# Patient Record
Sex: FEMALE | Race: WHITE | NOT HISPANIC OR LATINO | Employment: FULL TIME | URBAN - METROPOLITAN AREA
[De-identification: names, ages, dates, MRNs, and addresses within clinical notes are randomized per-mention and may not be internally consistent; named-entity substitution may affect disease eponyms.]

---

## 2017-01-12 ENCOUNTER — HOSPITAL ENCOUNTER (EMERGENCY)
Facility: HOSPITAL | Age: 52
Discharge: HOME | End: 2017-01-12
Attending: EMERGENCY MEDICINE | Admitting: EMERGENCY MEDICINE

## 2017-01-12 VITALS
BODY MASS INDEX: 23.05 KG/M2 | HEART RATE: 79 BPM | WEIGHT: 135 LBS | RESPIRATION RATE: 19 BRPM | TEMPERATURE: 98.2 F | HEIGHT: 64 IN | OXYGEN SATURATION: 99 % | DIASTOLIC BLOOD PRESSURE: 73 MMHG | SYSTOLIC BLOOD PRESSURE: 131 MMHG

## 2017-01-12 DIAGNOSIS — G51.0 BELL'S PALSY: Primary | ICD-10-CM

## 2017-01-12 PROCEDURE — 99283 EMERGENCY DEPT VISIT LOW MDM: CPT

## 2017-01-12 RX ORDER — FAMCICLOVIR 500 MG/1
500 TABLET ORAL 3 TIMES DAILY
Qty: 21 TABLET | Refills: 0 | Status: SHIPPED | OUTPATIENT
Start: 2017-01-12 | End: 2020-09-10 | Stop reason: SDUPTHER

## 2017-01-12 RX ORDER — FENOFIBRATE 145 MG/1
145 TABLET, COATED ORAL DAILY
COMMUNITY
End: 2020-10-13 | Stop reason: SDUPTHER

## 2017-01-12 RX ORDER — METHYLPREDNISOLONE 4 MG/1
TABLET ORAL
Qty: 1 EACH | Refills: 0 | Status: SHIPPED | OUTPATIENT
Start: 2017-01-12 | End: 2020-09-10 | Stop reason: SDUPTHER

## 2017-01-12 NOTE — ED NOTES
Pt reports right facial numbness, facial drop, numbness to right arm x2 days     Lian Grace RN  01/12/17 4302

## 2017-08-16 ENCOUNTER — APPOINTMENT (OUTPATIENT)
Dept: WOMENS IMAGING | Facility: HOSPITAL | Age: 52
End: 2017-08-16

## 2017-08-16 PROCEDURE — 77067 SCR MAMMO BI INCL CAD: CPT | Performed by: RADIOLOGY

## 2017-08-16 PROCEDURE — 77063 BREAST TOMOSYNTHESIS BI: CPT | Performed by: RADIOLOGY

## 2018-08-20 ENCOUNTER — APPOINTMENT (OUTPATIENT)
Dept: WOMENS IMAGING | Facility: HOSPITAL | Age: 53
End: 2018-08-20

## 2018-08-20 PROCEDURE — 77063 BREAST TOMOSYNTHESIS BI: CPT | Performed by: RADIOLOGY

## 2018-08-20 PROCEDURE — 77067 SCR MAMMO BI INCL CAD: CPT | Performed by: RADIOLOGY

## 2019-08-22 ENCOUNTER — APPOINTMENT (OUTPATIENT)
Dept: WOMENS IMAGING | Facility: HOSPITAL | Age: 54
End: 2019-08-22

## 2019-08-22 PROCEDURE — 77067 SCR MAMMO BI INCL CAD: CPT | Performed by: RADIOLOGY

## 2019-08-22 PROCEDURE — 77063 BREAST TOMOSYNTHESIS BI: CPT | Performed by: RADIOLOGY

## 2020-08-24 ENCOUNTER — APPOINTMENT (OUTPATIENT)
Dept: WOMENS IMAGING | Facility: HOSPITAL | Age: 55
End: 2020-08-24

## 2020-08-24 PROCEDURE — 77063 BREAST TOMOSYNTHESIS BI: CPT | Performed by: RADIOLOGY

## 2020-08-24 PROCEDURE — 77067 SCR MAMMO BI INCL CAD: CPT | Performed by: RADIOLOGY

## 2020-09-03 DIAGNOSIS — E78.2 HYPERLIPIDEMIA TYPE III: Primary | ICD-10-CM

## 2020-09-03 DIAGNOSIS — Z80.9 FAMILY HISTORY OF CANCER: ICD-10-CM

## 2020-09-03 DIAGNOSIS — E55.9 VITAMIN D DEFICIENCY: ICD-10-CM

## 2020-09-03 DIAGNOSIS — E78.2 HYPERLIPIDEMIA TYPE III: ICD-10-CM

## 2020-09-04 PROBLEM — E78.2 HYPERLIPIDEMIA TYPE III: Status: ACTIVE | Noted: 2020-09-04

## 2020-09-04 PROBLEM — Z83.71 FAMILY HISTORY OF COLONIC POLYPS: Status: ACTIVE | Noted: 2020-09-04

## 2020-09-04 PROBLEM — Z83.719 FAMILY HISTORY OF COLONIC POLYPS: Status: ACTIVE | Noted: 2020-09-04

## 2020-09-04 PROBLEM — K59.00 CONSTIPATION: Status: ACTIVE | Noted: 2020-09-04

## 2020-09-04 PROBLEM — E55.9 VITAMIN D DEFICIENCY: Status: ACTIVE | Noted: 2020-09-04

## 2020-09-04 PROBLEM — I73.00 RAYNAUD'S DISEASE WITHOUT GANGRENE: Status: ACTIVE | Noted: 2020-09-04

## 2020-09-04 PROBLEM — Z00.00 HEALTH MAINTENANCE EXAMINATION: Status: ACTIVE | Noted: 2020-09-04

## 2020-09-04 PROBLEM — Z80.0 FAMILY HISTORY OF COLON CANCER: Status: ACTIVE | Noted: 2020-09-04

## 2020-09-04 LAB
25(OH)D3+25(OH)D2 SERPL-MCNC: 48.4 NG/ML (ref 30–100)
ALBUMIN SERPL-MCNC: 5.1 G/DL (ref 3.8–4.9)
ALBUMIN/GLOB SERPL: 2.4 {RATIO} (ref 1.2–2.2)
ALP SERPL-CCNC: 74 IU/L (ref 39–117)
ALT SERPL-CCNC: 16 IU/L (ref 0–32)
AST SERPL-CCNC: 20 IU/L (ref 0–40)
BASOPHILS # BLD AUTO: 0 X10E3/UL (ref 0–0.2)
BASOPHILS NFR BLD AUTO: 1 %
BILIRUB SERPL-MCNC: 0.4 MG/DL (ref 0–1.2)
BUN SERPL-MCNC: 18 MG/DL (ref 6–24)
BUN/CREAT SERPL: 18 (ref 9–23)
CALCIUM SERPL-MCNC: 9.9 MG/DL (ref 8.7–10.2)
CHLORIDE SERPL-SCNC: 100 MMOL/L (ref 96–106)
CHOLEST SERPL-MCNC: 211 MG/DL (ref 100–199)
CO2 SERPL-SCNC: 26 MMOL/L (ref 20–29)
CREAT SERPL-MCNC: 0.99 MG/DL (ref 0.57–1)
EOSINOPHIL # BLD AUTO: 0.2 X10E3/UL (ref 0–0.4)
EOSINOPHIL NFR BLD AUTO: 3 %
ERYTHROCYTE [DISTWIDTH] IN BLOOD BY AUTOMATED COUNT: 12.9 % (ref 11.7–15.4)
GLOBULIN SER CALC-MCNC: 2.1 G/DL (ref 1.5–4.5)
GLUCOSE SERPL-MCNC: 85 MG/DL (ref 65–99)
HCT VFR BLD AUTO: 41.8 % (ref 34–46.6)
HDLC SERPL-MCNC: 50 MG/DL
HGB BLD-MCNC: 13.7 G/DL (ref 11.1–15.9)
IMM GRANULOCYTES # BLD AUTO: 0 X10E3/UL (ref 0–0.1)
IMM GRANULOCYTES NFR BLD AUTO: 0 %
LDLC SERPL CALC-MCNC: 134 MG/DL (ref 0–99)
LDLC/HDLC SERPL: 2.7 RATIO (ref 0–3.2)
LYMPHOCYTES # BLD AUTO: 1.6 X10E3/UL (ref 0.7–3.1)
LYMPHOCYTES NFR BLD AUTO: 30 %
MCH RBC QN AUTO: 29.5 PG (ref 26.6–33)
MCHC RBC AUTO-ENTMCNC: 32.8 G/DL (ref 31.5–35.7)
MCV RBC AUTO: 90 FL (ref 79–97)
MONOCYTES # BLD AUTO: 0.4 X10E3/UL (ref 0.1–0.9)
MONOCYTES NFR BLD AUTO: 8 %
NEUTROPHILS # BLD AUTO: 3.1 X10E3/UL (ref 1.4–7)
NEUTROPHILS NFR BLD AUTO: 58 %
PLATELET # BLD AUTO: 207 X10E3/UL (ref 150–450)
POTASSIUM SERPL-SCNC: 4.6 MMOL/L (ref 3.5–5.2)
PROT SERPL-MCNC: 7.2 G/DL (ref 6–8.5)
RBC # BLD AUTO: 4.65 X10E6/UL (ref 3.77–5.28)
SODIUM SERPL-SCNC: 142 MMOL/L (ref 134–144)
TRIGL SERPL-MCNC: 153 MG/DL (ref 0–149)
VLDLC SERPL CALC-MCNC: 27 MG/DL (ref 5–40)
WBC # BLD AUTO: 5.4 X10E3/UL (ref 3.4–10.8)

## 2020-09-04 NOTE — PROGRESS NOTES
Subjective   Candace Dennis is a 54 y.o. female.     Chief Complaint   Patient presents with   • Annual Exam       History of Present Illness   This is a 54-year-old female with past medical history significant for hyperlipidemia type III, vitamin D deficiency, raynauds disease, chronic constipation, family history of colon cancer, status post tonsillectomy and adenoidectomy at age 17, status post bilateral tubal ligation as well as an ablation in 2009 who presents for a routine health maintenance exam.  The patient has been working from home and practicing social distancing.  She has recently been caring for HER-2-year-old grandchild and her daughter is due to have a little girl in 2 months.  She is currently walking for exercise.  She is currently due for colonoscopy but is not wanting to schedule this today.  She does have a history of Raynauds, this is been relatively well controlled recently but does tend to exacerbate especially in the winter months and occasionally even during the summer, she has a prescription at home and will contact us with the name of this prescription.  The patient is getting calcium in her diet on a regular basis and is taking a vitamin D supplement.  DEXA scan was last completed with her gynecologist, mammogram is also up-to-date with her gynecologist.    The following portions of the patient's history were reviewed and updated as appropriate: allergies, current medications, past family history, past medical history, past social history, past surgical history and problem list.    Depression Screen:  PHQ-2/PHQ-9 Depression Screening 9/10/2020   Little interest or pleasure in doing things 0   Feeling down, depressed, or hopeless 1   Total Score 1       Assessment/Plan   Candace was seen today for annual exam.    Diagnoses and all orders for this visit:    Health maintenance examination    Hyperlipidemia type III    Family history of colon cancer    Family history of colonic polyps    Raynaud's  disease without gangrene    Vitamin D deficiency    #1.  Health maintenance exam-exam completed, labs reviewed with the patient.  2.  Hyperlipidemia type III-LDL goal is less than 130, total cholesterol 211, HDL 50, triglycerides 153 and , patient is currently on fenofibrate 145 mg once daily, would recommend that we continue current treatment.  3.  Family history of colon cancer/family history of colon polyps-last colonoscopy completed approximately May 2015, patient is due for a repeat colonoscopy but is not wanting to schedule this at this time, we did discuss the need to make sure that this is scheduled in a timely fashion due to increased risk.  Patient will call back to the office to schedule this.  4.  Raynauds-currently stable, patient does have more frequent symptoms occurring in the winter and also has symptoms during the summer months as well, patient is on medication which she believes is amlodipine and will call us back with the specific name and dose of this medication.  5.  Vitamin D deficiency-level is currently 48.4, would recommend continuing current treatment with vitamin D3.     EKG in office-sinus rhythm with a ventricular rate of 78, no ST or T wave changes of concern, normal axis deviation.  We did discuss vaccinations, specifically influenza vaccination which she has previously deferred, Shingrix vaccination was discussed in detail, we also discussed the importance of obtaining a Tdap vaccination before her grandchild is born, she will contact the office to come back in to have this vaccination.    Follow-up in 6 months with lab-CMP, lipid, vitamin D         Past Medical History:   Diagnosis Date   • Anal fissure    • Constipation    • Family history of colon cancer    • Family history of polyps in the colon    • Hyperlipidemia type III    • Insomnia    • Non-smoker    • Raynaud phenomenon    • Rectal bleeding    • Rectal pain        Past Surgical History:   Procedure Laterality Date  "  • COLONOSCOPY      2006-Dr. Graves-Normal per pt./5/19/2015-Internal hemorrhoids   • ENDOMETRIAL ABLATION  2007   • TONSILLECTOMY AND ADENOIDECTOMY      at age 17   • TUBAL ABDOMINAL LIGATION  2007       Family History   Problem Relation Age of Onset   • Colon cancer Other         aunt, uncle, cousin   • Colon polyps Other         Aunt, Uncle, cousin       Social History     Socioeconomic History   • Marital status:      Spouse name: Not on file   • Number of children: Not on file   • Years of education: Not on file   • Highest education level: Not on file   Tobacco Use   • Smoking status: Never Smoker   • Smokeless tobacco: Never Used   Substance and Sexual Activity   • Alcohol use: Not Currently     Comment: social   • Drug use: Defer       Current Outpatient Medications   Medication Sig Dispense Refill   • fenofibrate (TRICOR) 145 MG tablet Take 145 mg by mouth Daily.     • Progesterone Micronized (PROGESTERONE PO) Take  by mouth.     • ESTROGEL 0.75 MG/1.25 GM (0.06%) topical gel CHRISTINE 2 PUMPS ONTO THE SKIN D     • traZODone (DESYREL) 50 MG tablet TK 1/2 T PO HS       No current facility-administered medications for this visit.        Review of Systems    Objective   /60   Pulse 80   Ht 162.6 cm (64\")   Wt 62.6 kg (138 lb)   SpO2 99%   BMI 23.69 kg/m²     Physical Exam  Constitutional:  The patient appears well developed and well nourished and is in no acute distress.  Head & Face:  Head and face are symmetric, normocephalic and atraumatic.  Palpation of the face and sinuses show them to be non-tender and without masses.  Eyes:  Inspection of the conjunctiva and lids reveal no swelling, erythema or discharge.  The pupils are equal, round and reactive to light.  Fundoscopic exam of the eyes reveals normal fundi and optic discs.  Ears, Nose, Mouth & Throat:  On inspecton, the ears and nose are within normal limits.  Otoscopic exam reveals tympanic membranes are translucent with normal light " reflex.  Canals are patent without erythema.  Lips, teeth, and gums appear healthy with good dentition.  The oropharynx is normal with no erythema, edema, exudate or lesions.  Neck:  The neck was normal in appearance and supple.  The trachea was midline.  Exam of the thyroid reveals no thromegaly or masses.  Pulmonary:  Respiratory effort is normal without evidence of respiratory distress.  Lungs are clear to auscultation bilaterally.  Cardiovascular:  The apical impulse was normal,  The heart rate was normal,  The rhythm was regular.  Hearts sounds reveal a normal S1, a normal S2, no gallops, rubs or murmurs.    Extremities:  Pedal pulses - Dorsalis Pedis/Posterior Tibialis are 2+/4 bilaterally.  Examination of the extremities show no edema.   Abdomen:  The abdomen is soft, nontender and without masses.  Bowel sounds are positive in all four quadrants.  There is no palpable hepatomegaly or splenomegaly.  Lymphatic:  There is no palpable lymphadenopathy appreciated of neck, axilla or inguinal regions.  Musculoskeletal: Gait and station are within normal limits.  Skin:  Skin and subcutaneous tissues are normal and without rashes or lesions.   Neurologic:  Cranial nerves II-XII are intact.  Reflexes are 2+ and symmetric.  Patient demonstrates no sensory loss.  Psychiatric:  Patient's judgement and insight are unimpaired.  Patient is oriented to person, time and place.  Patient's mood and affect are normal.      Recent Results (from the past 2016 hour(s))   CBC & Differential    Collection Time: 09/03/20  9:36 AM   Result Value Ref Range    WBC 5.4 3.4 - 10.8 x10E3/uL    RBC 4.65 3.77 - 5.28 x10E6/uL    Hemoglobin 13.7 11.1 - 15.9 g/dL    Hematocrit 41.8 34.0 - 46.6 %    MCV 90 79 - 97 fL    MCH 29.5 26.6 - 33.0 pg    MCHC 32.8 31.5 - 35.7 g/dL    RDW 12.9 11.7 - 15.4 %    Platelets 207 150 - 450 x10E3/uL    Neutrophil Rel % 58 Not Estab. %    Lymphocyte Rel % 30 Not Estab. %    Monocyte Rel % 8 Not Estab. %     Eosinophil Rel % 3 Not Estab. %    Basophil Rel % 1 Not Estab. %    Neutrophils Absolute 3.1 1.4 - 7.0 x10E3/uL    Lymphocytes Absolute 1.6 0.7 - 3.1 x10E3/uL    Monocytes Absolute 0.4 0.1 - 0.9 x10E3/uL    Eosinophils Absolute 0.2 0.0 - 0.4 x10E3/uL    Basophils Absolute 0.0 0.0 - 0.2 x10E3/uL    Immature Granulocyte Rel % 0 Not Estab. %    Immature Grans Absolute 0.0 0.0 - 0.1 x10E3/uL   Vitamin D 25 hydroxy    Collection Time: 09/03/20  9:36 AM   Result Value Ref Range    25 Hydroxy, Vitamin D 48.4 30.0 - 100.0 ng/mL   Lipid Panel With LDL / HDL Ratio    Collection Time: 09/03/20  9:36 AM   Result Value Ref Range    Total Cholesterol 211 (H) 100 - 199 mg/dL    Triglycerides 153 (H) 0 - 149 mg/dL    HDL Cholesterol 50 >39 mg/dL    VLDL Cholesterol Remy 27 5 - 40 mg/dL    LDL Chol Calc (NIH) 134 (H) 0 - 99 mg/dL    LDL/HDL RATIO 2.7 0.0 - 3.2 ratio   Comprehensive Metabolic Panel    Collection Time: 09/03/20  9:36 AM   Result Value Ref Range    Glucose 85 65 - 99 mg/dL    BUN 18 6 - 24 mg/dL    Creatinine 0.99 0.57 - 1.00 mg/dL    eGFR Non African Am 64 >59 mL/min/1.73    eGFR African Am 74 >59 mL/min/1.73    BUN/Creatinine Ratio 18 9 - 23    Sodium 142 134 - 144 mmol/L    Potassium 4.6 3.5 - 5.2 mmol/L    Chloride 100 96 - 106 mmol/L    Total CO2 26 20 - 29 mmol/L    Calcium 9.9 8.7 - 10.2 mg/dL    Total Protein 7.2 6.0 - 8.5 g/dL    Albumin 5.1 (H) 3.8 - 4.9 g/dL    Globulin 2.1 1.5 - 4.5 g/dL    A/G Ratio 2.4 (H) 1.2 - 2.2    Total Bilirubin 0.4 0.0 - 1.2 mg/dL    Alkaline Phosphatase 74 39 - 117 IU/L    AST (SGOT) 20 0 - 40 IU/L    ALT (SGPT) 16 0 - 32 IU/L

## 2020-09-10 ENCOUNTER — OFFICE VISIT (OUTPATIENT)
Dept: INTERNAL MEDICINE | Facility: CLINIC | Age: 55
End: 2020-09-10

## 2020-09-10 VITALS
DIASTOLIC BLOOD PRESSURE: 60 MMHG | HEART RATE: 80 BPM | WEIGHT: 138 LBS | BODY MASS INDEX: 23.56 KG/M2 | HEIGHT: 64 IN | SYSTOLIC BLOOD PRESSURE: 110 MMHG | OXYGEN SATURATION: 99 %

## 2020-09-10 DIAGNOSIS — E78.2 HYPERLIPIDEMIA TYPE III: ICD-10-CM

## 2020-09-10 DIAGNOSIS — Z80.0 FAMILY HISTORY OF COLON CANCER: ICD-10-CM

## 2020-09-10 DIAGNOSIS — Z83.71 FAMILY HISTORY OF COLONIC POLYPS: ICD-10-CM

## 2020-09-10 DIAGNOSIS — I73.00 RAYNAUD'S DISEASE WITHOUT GANGRENE: ICD-10-CM

## 2020-09-10 DIAGNOSIS — Z00.00 HEALTH MAINTENANCE EXAMINATION: Primary | ICD-10-CM

## 2020-09-10 DIAGNOSIS — E55.9 VITAMIN D DEFICIENCY: ICD-10-CM

## 2020-09-10 PROCEDURE — 99396 PREV VISIT EST AGE 40-64: CPT | Performed by: INTERNAL MEDICINE

## 2020-09-10 RX ORDER — ESTRADIOL 1.25 MG/1.25G
GEL TOPICAL
COMMUNITY
End: 2020-09-10 | Stop reason: SDUPTHER

## 2020-09-10 RX ORDER — ESTRADIOL 0.75 MG/1.25G
GEL, METERED TOPICAL
COMMUNITY
Start: 2020-08-05 | End: 2020-12-11 | Stop reason: SDUPTHER

## 2020-09-10 RX ORDER — TRAZODONE HYDROCHLORIDE 50 MG/1
TABLET ORAL
COMMUNITY
Start: 2020-08-05 | End: 2021-04-30

## 2020-09-11 RX ORDER — AMLODIPINE BESYLATE 2.5 MG/1
2.5 TABLET ORAL DAILY
COMMUNITY
End: 2021-04-30

## 2020-10-06 PROCEDURE — 90715 TDAP VACCINE 7 YRS/> IM: CPT | Performed by: INTERNAL MEDICINE

## 2020-10-06 PROCEDURE — 90471 IMMUNIZATION ADMIN: CPT | Performed by: INTERNAL MEDICINE

## 2020-10-14 RX ORDER — FENOFIBRATE 145 MG/1
145 TABLET, COATED ORAL DAILY
Qty: 90 TABLET | Refills: 1 | Status: SHIPPED | OUTPATIENT
Start: 2020-10-14 | End: 2021-08-09 | Stop reason: SDUPTHER

## 2020-12-11 ENCOUNTER — OFFICE VISIT (OUTPATIENT)
Dept: INTERNAL MEDICINE | Facility: CLINIC | Age: 55
End: 2020-12-11

## 2020-12-11 VITALS
BODY MASS INDEX: 22.71 KG/M2 | DIASTOLIC BLOOD PRESSURE: 80 MMHG | SYSTOLIC BLOOD PRESSURE: 122 MMHG | TEMPERATURE: 98 F | WEIGHT: 133 LBS | HEIGHT: 64 IN

## 2020-12-11 DIAGNOSIS — R51.9 PERSISTENT HEADACHES: Primary | ICD-10-CM

## 2020-12-11 PROCEDURE — 99213 OFFICE O/P EST LOW 20 MIN: CPT | Performed by: INTERNAL MEDICINE

## 2020-12-11 RX ORDER — MULTIVIT WITH MINERALS/LUTEIN
250 TABLET ORAL DAILY
COMMUNITY

## 2020-12-11 RX ORDER — GABAPENTIN 300 MG/1
CAPSULE ORAL
Qty: 60 CAPSULE | Refills: 0 | Status: SHIPPED | OUTPATIENT
Start: 2020-12-11 | End: 2021-04-30

## 2020-12-11 NOTE — PROGRESS NOTES
Subjective   Candace Dennis is a 55 y.o. female.     Chief Complaint   Patient presents with   • Headache       History of Present Illness   This is a 54-year-old female with past medical history significant for hyperlipidemia type III, vitamin D deficiency, raynauds disease, chronic constipation, family history of colon cancer, status post tonsillectomy and adenoidectomy at age 17, status post bilateral tubal ligation as well as an ablation in 2009 who presents for an acute visit..  The patient has been working from home and practicing social distancing.  The patient presents with a 4-week history of headaches which occur predominantly when she lies down.  The pain radiates down the back of her neck.  She has noted some mild tingling in the left anterior aspect of her chin.  The patient states that she noted that the symptoms were similar to a previous episode that she had 2 years ago when she was subsequently diagnosed with Bell's palsy.  The patient states that the head and neck pain have been fairly persistent.  She has not had any visual changes, no blurred vision, no light sensitivity.  She has taken Tylenol without any significant benefit.  She previously had been on amitriptyline and had some of these tablets left over which did provide some mild relief.  No other voiced complaints.    The following portions of the patient's history were reviewed and updated as appropriate: allergies, current medications, past family history, past medical history, past social history, past surgical history and problem list.    Depression Screen:  PHQ-2/PHQ-9 Depression Screening 9/10/2020   Little interest or pleasure in doing things 0   Feeling down, depressed, or hopeless 1   Total Score 1       Assessment/Plan   Diagnoses and all orders for this visit:    1. Persistent headaches (Primary)  -     gabapentin (NEURONTIN) 300 MG capsule; 1 tablet p.o. twice daily  Dispense: 60 capsule; Refill: 0         #1.  Cervicogenic  headaches-unclear etiology, examination was normal other than some mild tenderness in the left posterior neck with certain range of motion, recommend we treat patient with gabapentin to see if this helps to alleviate her headaches, would recommend avoiding nonsteroidal anti-inflammatory agents given previous renal functions.        Past Medical History:   Diagnosis Date   • Anal fissure    • Constipation    • Family history of colon cancer    • Family history of polyps in the colon    • Hyperlipidemia type III    • Insomnia    • Non-smoker    • Raynaud phenomenon    • Rectal bleeding    • Rectal pain        Past Surgical History:   Procedure Laterality Date   • COLONOSCOPY      2006-Dr. Graves-Normal per pt./5/19/2015-Internal hemorrhoids   • ENDOMETRIAL ABLATION  2007   • TONSILLECTOMY AND ADENOIDECTOMY      at age 17   • TUBAL ABDOMINAL LIGATION  2007       Family History   Problem Relation Age of Onset   • Colon cancer Other         aunt, uncle, cousin   • Colon polyps Other         Aunt, Uncle, cousin       Social History     Socioeconomic History   • Marital status:      Spouse name: Not on file   • Number of children: Not on file   • Years of education: Not on file   • Highest education level: Not on file   Tobacco Use   • Smoking status: Never Smoker   • Smokeless tobacco: Never Used   Substance and Sexual Activity   • Alcohol use: Not Currently     Comment: social   • Drug use: Defer       Current Outpatient Medications   Medication Sig Dispense Refill   • Cholecalciferol (vitamin D3) 125 MCG (5000 UT) capsule capsule Take 5,000 Units by mouth Daily.     • fenofibrate (TRICOR) 145 MG tablet Take 1 tablet by mouth Daily. 90 tablet 1   • traZODone (DESYREL) 50 MG tablet TK 1/2 T PO HS     • vitamin C (ASCORBIC ACID) 250 MG tablet Take 250 mg by mouth Daily.     • amLODIPine (NORVASC) 2.5 MG tablet Take 2.5 mg by mouth Daily.     • gabapentin (NEURONTIN) 300 MG capsule 1 tablet p.o. twice daily 60  "capsule 0     No current facility-administered medications for this visit.        Review of Systems   Musculoskeletal: Positive for neck pain and neck stiffness.   Neurological: Positive for numbness and headache.   All other systems reviewed and are negative.      Objective   /80   Temp 98 °F (36.7 °C)   Ht 162.6 cm (64\")   Wt 60.3 kg (133 lb)   BMI 22.83 kg/m²     Physical Exam  Constitutional:  The patient appears well developed and well nourished and is in no acute distress.  Head & Face:  Head and face are symmetric, normocephalic and atraumatic.  Palpation of the face and sinuses show them to be non-tender and without masses.  Normal sensation over the chin with monofilament examination  Eyes:  Inspection of the conjunctiva and lids reveal no swelling, erythema or discharge.  The pupils are equal, round and reactive to light.  Fundoscopic exam of the eyes reveals normal fundi and optic discs.  Ears, Nose, Mouth & Throat:  On inspecton, the ears and nose are within normal limits.  Otoscopic exam reveals tympanic membranes are translucent with normal light reflex.  Canals are patent without erythema.  Lips, teeth, and gums appear healthy with good dentition.  The oropharynx is normal with no erythema, edema, exudate or lesions.  Neck:  The neck was normal in appearance and supple.  The trachea was midline.  Exam of the thyroid reveals no thromegaly or masses.  Pulmonary:  Respiratory effort is normal without evidence of respiratory distress.  Lungs are clear to auscultation bilaterally.  Cardiovascular:  The apical impulse was normal,  The heart rate was normal,  The rhythm was regular.  Hearts sounds reveal a normal S1, a normal S2, no gallops, rubs or murmurs.    Extremities:  Pedal pulses - Dorsalis Pedis/Posterior Tibialis are 2+/4 bilaterally.  Examination of the extremities show no edema.   Musculoskeletal: Gait and station are within normal limits.  Mild tenderness over the left posterior neck, " mild tenderness to palpation, mild tenderness with lateral rotation and extension  Skin:  Skin and subcutaneous tissues are normal and without rashes or lesions.   Neurologic:  Cranial nerves II-XII are intact.  Reflexes are 2+ and symmetric.  Patient demonstrates no sensory loss.  Psychiatric:  Patient's judgement and insight are unimpaired.  Patient is oriented to person, time and place.  Patient's mood and affect are normal.  No results found for this or any previous visit (from the past 2016 hour(s)).

## 2021-03-30 ENCOUNTER — BULK ORDERING (OUTPATIENT)
Dept: CASE MANAGEMENT | Facility: OTHER | Age: 56
End: 2021-03-30

## 2021-03-30 DIAGNOSIS — Z23 IMMUNIZATION DUE: ICD-10-CM

## 2021-04-30 ENCOUNTER — OFFICE VISIT (OUTPATIENT)
Dept: INTERNAL MEDICINE | Facility: CLINIC | Age: 56
End: 2021-04-30

## 2021-04-30 VITALS
BODY MASS INDEX: 22.88 KG/M2 | TEMPERATURE: 96.1 F | WEIGHT: 134 LBS | HEART RATE: 69 BPM | DIASTOLIC BLOOD PRESSURE: 80 MMHG | SYSTOLIC BLOOD PRESSURE: 116 MMHG | RESPIRATION RATE: 16 BRPM | OXYGEN SATURATION: 98 % | HEIGHT: 64 IN

## 2021-04-30 DIAGNOSIS — Z00.00 ROUTINE GENERAL MEDICAL EXAMINATION AT A HEALTH CARE FACILITY: Primary | ICD-10-CM

## 2021-04-30 PROBLEM — E78.2 MODERATE MIXED HYPERLIPIDEMIA NOT REQUIRING STATIN THERAPY: Chronic | Status: ACTIVE | Noted: 2020-09-04

## 2021-04-30 PROBLEM — E78.1 HYPERTRIGLYCERIDEMIA: Status: ACTIVE | Noted: 2019-07-02

## 2021-04-30 PROBLEM — Z83.719 FAMILY HISTORY OF COLONIC POLYPS: Status: RESOLVED | Noted: 2020-09-04 | Resolved: 2021-04-30

## 2021-04-30 PROBLEM — G47.00 MIXED INSOMNIA: Status: ACTIVE | Noted: 2019-11-06

## 2021-04-30 PROBLEM — R51.9 PERSISTENT HEADACHES: Status: RESOLVED | Noted: 2020-12-11 | Resolved: 2021-04-30

## 2021-04-30 PROBLEM — Z83.71 FAMILY HISTORY OF COLONIC POLYPS: Status: RESOLVED | Noted: 2020-09-04 | Resolved: 2021-04-30

## 2021-04-30 LAB
BILIRUB BLD-MCNC: NEGATIVE MG/DL
CLARITY, POC: ABNORMAL
COLOR UR: YELLOW
GLUCOSE UR STRIP-MCNC: NEGATIVE MG/DL
KETONES UR QL: NEGATIVE
LEUKOCYTE EST, POC: NEGATIVE
NITRITE UR-MCNC: NEGATIVE MG/ML
PH UR: 7 [PH] (ref 5–8)
PROT UR STRIP-MCNC: NEGATIVE MG/DL
RBC # UR STRIP: NEGATIVE /UL
SP GR UR: 1.01 (ref 1–1.03)
UROBILINOGEN UR QL: NORMAL

## 2021-04-30 PROCEDURE — 99396 PREV VISIT EST AGE 40-64: CPT | Performed by: INTERNAL MEDICINE

## 2021-04-30 PROCEDURE — 81003 URINALYSIS AUTO W/O SCOPE: CPT | Performed by: INTERNAL MEDICINE

## 2021-04-30 RX ORDER — MELOXICAM 15 MG/1
15 TABLET ORAL DAILY
COMMUNITY
Start: 2020-12-28 | End: 2021-04-30

## 2021-04-30 RX ORDER — CYCLOBENZAPRINE HCL 10 MG
10 TABLET ORAL
COMMUNITY
Start: 2020-12-28 | End: 2021-04-30

## 2021-04-30 NOTE — PROGRESS NOTES
Chief Complaint   Patient presents with   • Establish Care   • Annual Exam     PT HERE TO ESTABLISH CARE. PAST MEDICAL, SURGICAL, SOCIAL, FAMILY HISTORY REVIEWED WITH PATIENT AND PLACED IN CHART.     Subjective   History of Present Illness    Candace Dennis is a 55 y.o. female here for annual wellness. Pt does not have acute issues to discuss today. States overall things are going well. Taking all meds as prescribed without any issues.     The following portions of the patient's history were reviewed and updated as appropriate: allergies, current medications, past family history, past medical history, past social history, past surgical history, and problem list.    Patient Care Team:  Shruti Link MD as PCP - General (Internal Medicine & Pediatrics)  Sarina Leigh MD as Consulting Physician (Obstetrics and Gynecology)    Review of Systems   Constitutional: Negative for activity change, chills and fever.   HENT: Negative for congestion, ear pain, rhinorrhea and sore throat.    Eyes: Negative for double vision, pain and visual disturbance.   Respiratory: Positive for cough. Negative for shortness of breath and wheezing.    Cardiovascular: Negative for chest pain, palpitations and leg swelling.   Gastrointestinal: Negative for abdominal pain, blood in stool, constipation, diarrhea, nausea and vomiting.   Endocrine: Negative for cold intolerance and heat intolerance.   Genitourinary: Negative for difficulty urinating, dysuria and frequency.   Musculoskeletal: Negative for arthralgias and myalgias.   Skin: Negative for rash and skin lesions.   Neurological: Negative for dizziness, tremors and headache.   Hematological: Negative for adenopathy. Does not bruise/bleed easily.   Psychiatric/Behavioral: Negative for behavioral problems and suicidal ideas.       Body mass index is 23 kg/m².   Vitals:    04/30/21 0852   BP: 116/80   Pulse: 69   Resp: 16   Temp: 96.1 °F (35.6 °C)   SpO2: 98%   Weight: 60.8 kg (134 lb)  "  Height: 162.6 cm (64\")        Objective   Physical Exam  Vitals and nursing note reviewed.   Constitutional:       General: She is not in acute distress.     Appearance: Normal appearance.   HENT:      Head: Normocephalic and atraumatic.      Right Ear: Tympanic membrane and ear canal normal.      Left Ear: Tympanic membrane and ear canal normal.      Nose: Nose normal.      Mouth/Throat:      Mouth: Mucous membranes are moist.      Pharynx: Oropharynx is clear.   Eyes:      Extraocular Movements: Extraocular movements intact.      Conjunctiva/sclera: Conjunctivae normal.      Pupils: Pupils are equal, round, and reactive to light.   Cardiovascular:      Rate and Rhythm: Normal rate and regular rhythm.      Heart sounds: Normal heart sounds. No murmur heard.     Pulmonary:      Effort: Pulmonary effort is normal. No respiratory distress.      Breath sounds: Normal breath sounds. No wheezing or rhonchi.   Abdominal:      General: Bowel sounds are normal. There is no distension.      Palpations: Abdomen is soft. There is no mass.      Tenderness: There is no abdominal tenderness.      Comments: no hepatosplenomegaly   Musculoskeletal:         General: No deformity. Normal range of motion.      Cervical back: Normal range of motion and neck supple.   Skin:     General: Skin is warm and dry.      Capillary Refill: Capillary refill takes less than 2 seconds.      Findings: No lesion or rash.   Neurological:      General: No focal deficit present.      Mental Status: She is alert and oriented to person, place, and time.      Cranial Nerves: No cranial nerve deficit.   Psychiatric:         Mood and Affect: Mood normal.         Behavior: Behavior normal.         Judgment: Judgment normal.         Brief Urine Lab Results  (Last result in the past 365 days)      Color   Clarity   Blood   Leuk Est   Nitrite   Protein   CREAT   Urine HCG        04/30/21 0946 Yellow Cloudy Negative Negative Negative Negative         "         Current Outpatient Medications:   •  Cholecalciferol (vitamin D3) 125 MCG (5000 UT) capsule capsule, Take 5,000 Units by mouth Daily., Disp: , Rfl:   •  fenofibrate (TRICOR) 145 MG tablet, Take 1 tablet by mouth Daily., Disp: 90 tablet, Rfl: 1  •  Magnesium 100 MG capsule, , Disp: , Rfl:   •  vitamin C (ASCORBIC ACID) 250 MG tablet, Take 250 mg by mouth Daily., Disp: , Rfl:      Lab Results   Component Value Date    VVJE22DO 48.4 09/03/2020        Health Maintenance   Topic Date Due   • MAMMOGRAM  Never done   • COLONOSCOPY  05/19/2020   • PAP SMEAR  Never done   • ZOSTER VACCINE (1 of 2) 04/30/2021 (Originally 11/23/2014)   • INFLUENZA VACCINE  08/01/2021   • LIPID PANEL  09/03/2021   • TDAP/TD VACCINES (2 - Td) 10/06/2030        Immunization History   Administered Date(s) Administered   • COVID-19 (PFIZER) 03/09/2021, 03/30/2021   • Td, Not Adsorbed 10/01/2008   • Tdap 10/06/2020       Assessment/Plan     Annual Wellness  - Labs ordered today including CBC, CMP, Fasting lipid panel, HgbA1C, TSH and Vit D. Will call with results once available and make follow up plan and med changes as appropriate.   - Cont current medications for chronic medical issues, refills sent as needed today.   - EKG done today  - PAP smear up to date and managed by gynecology. Last 8/2019 and normal, never abn  - Mammo up to date and managed by gynecology. Last 8/2019 and normal. Gets extra screenign related to FHx of breast CA in Mom  - Cscope up to date and normal. Last done 5/19/2015 at [facility], repeat 5 years due to FHx. Due.  - DEXA not yet indicated, due at 65 yo  - Lung CA screening not indicated  - Immunizations: Flu due Fall 2021. TDaP done 2020. COVID series completed. Discussed shingrix, pt declines at this time.    - Depression screen reviewed with patient and negative.  - Advised behavioral modifications including dietary changes and increased exercise with goal of healthy weight and lifestyle.       Return in about 1 year  (around 4/30/2022) for Annual physical.     Roxanna Link MD  [facility]  Phone: [phone]  Fax: [phone]

## 2021-05-01 LAB
25(OH)D3+25(OH)D2 SERPL-MCNC: 60.3 NG/ML (ref 30–100)
ALBUMIN SERPL-MCNC: 4.9 G/DL (ref 3.5–5.2)
ALBUMIN/GLOB SERPL: 2 G/DL
ALP SERPL-CCNC: 82 U/L (ref 39–117)
ALT SERPL-CCNC: 16 U/L (ref 1–33)
AST SERPL-CCNC: 16 U/L (ref 1–32)
BASOPHILS # BLD AUTO: 0.04 10*3/MM3 (ref 0–0.2)
BASOPHILS NFR BLD AUTO: 0.7 % (ref 0–1.5)
BILIRUB SERPL-MCNC: 0.4 MG/DL (ref 0–1.2)
BUN SERPL-MCNC: 17 MG/DL (ref 6–20)
BUN/CREAT SERPL: 18.7 (ref 7–25)
CALCIUM SERPL-MCNC: 10.4 MG/DL (ref 8.6–10.5)
CHLORIDE SERPL-SCNC: 105 MMOL/L (ref 98–107)
CHOLEST SERPL-MCNC: 228 MG/DL (ref 0–200)
CO2 SERPL-SCNC: 30.4 MMOL/L (ref 22–29)
CREAT SERPL-MCNC: 0.91 MG/DL (ref 0.57–1)
EOSINOPHIL # BLD AUTO: 0.2 10*3/MM3 (ref 0–0.4)
EOSINOPHIL NFR BLD AUTO: 3.7 % (ref 0.3–6.2)
ERYTHROCYTE [DISTWIDTH] IN BLOOD BY AUTOMATED COUNT: 13 % (ref 12.3–15.4)
GLOBULIN SER CALC-MCNC: 2.5 GM/DL
GLUCOSE SERPL-MCNC: 87 MG/DL (ref 65–99)
HBA1C MFR BLD: 5.1 % (ref 4.8–5.6)
HCT VFR BLD AUTO: 42.9 % (ref 34–46.6)
HDLC SERPL-MCNC: 48 MG/DL (ref 40–60)
HGB BLD-MCNC: 14 G/DL (ref 12–15.9)
IMM GRANULOCYTES # BLD AUTO: 0.01 10*3/MM3 (ref 0–0.05)
IMM GRANULOCYTES NFR BLD AUTO: 0.2 % (ref 0–0.5)
LDLC SERPL CALC-MCNC: 140 MG/DL (ref 0–100)
LYMPHOCYTES # BLD AUTO: 1.75 10*3/MM3 (ref 0.7–3.1)
LYMPHOCYTES NFR BLD AUTO: 32.2 % (ref 19.6–45.3)
MCH RBC QN AUTO: 30 PG (ref 26.6–33)
MCHC RBC AUTO-ENTMCNC: 32.6 G/DL (ref 31.5–35.7)
MCV RBC AUTO: 91.9 FL (ref 79–97)
MONOCYTES # BLD AUTO: 0.43 10*3/MM3 (ref 0.1–0.9)
MONOCYTES NFR BLD AUTO: 7.9 % (ref 5–12)
NEUTROPHILS # BLD AUTO: 3 10*3/MM3 (ref 1.7–7)
NEUTROPHILS NFR BLD AUTO: 55.3 % (ref 42.7–76)
NRBC BLD AUTO-RTO: 0 /100 WBC (ref 0–0.2)
PLATELET # BLD AUTO: 230 10*3/MM3 (ref 140–450)
POTASSIUM SERPL-SCNC: 4.2 MMOL/L (ref 3.5–5.2)
PROT SERPL-MCNC: 7.4 G/DL (ref 6–8.5)
RBC # BLD AUTO: 4.67 10*6/MM3 (ref 3.77–5.28)
SODIUM SERPL-SCNC: 143 MMOL/L (ref 136–145)
TRIGL SERPL-MCNC: 222 MG/DL (ref 0–150)
TSH SERPL DL<=0.005 MIU/L-ACNC: 1.83 UIU/ML (ref 0.27–4.2)
VLDLC SERPL CALC-MCNC: 40 MG/DL (ref 5–40)
WBC # BLD AUTO: 5.43 10*3/MM3 (ref 3.4–10.8)

## 2021-05-05 ENCOUNTER — TELEPHONE (OUTPATIENT)
Dept: INTERNAL MEDICINE | Facility: CLINIC | Age: 56
End: 2021-05-05

## 2021-05-05 NOTE — TELEPHONE ENCOUNTER
Caller: Candace Dennis    Relationship: Self    Best call back number: [phone]     Caller requesting test results:PATIENT    What test was performed: LABS    When was the test performed: 04/30/21    Where was the test performed: [facility]      PLEASE ADVISE.

## 2021-08-06 ENCOUNTER — OFFICE VISIT (OUTPATIENT)
Dept: INTERNAL MEDICINE | Facility: CLINIC | Age: 56
End: 2021-08-06

## 2021-08-06 VITALS
RESPIRATION RATE: 16 BRPM | OXYGEN SATURATION: 98 % | WEIGHT: 136 LBS | HEIGHT: 64 IN | SYSTOLIC BLOOD PRESSURE: 110 MMHG | BODY MASS INDEX: 23.22 KG/M2 | HEART RATE: 70 BPM | DIASTOLIC BLOOD PRESSURE: 70 MMHG | TEMPERATURE: 96.6 F

## 2021-08-06 DIAGNOSIS — Z00.8 ENCOUNTER FOR BIOMETRIC SCREENING: Primary | ICD-10-CM

## 2021-08-06 DIAGNOSIS — E55.9 VITAMIN D DEFICIENCY: ICD-10-CM

## 2021-08-06 DIAGNOSIS — E78.1 HYPERTRIGLYCERIDEMIA: ICD-10-CM

## 2021-08-06 PROBLEM — E78.2 MODERATE MIXED HYPERLIPIDEMIA NOT REQUIRING STATIN THERAPY: Chronic | Status: RESOLVED | Noted: 2020-09-04 | Resolved: 2021-08-06

## 2021-08-06 PROCEDURE — 99214 OFFICE O/P EST MOD 30 MIN: CPT | Performed by: INTERNAL MEDICINE

## 2021-08-06 RX ORDER — ASPIRIN 81 MG/1
TABLET ORAL
COMMUNITY
End: 2022-02-08

## 2021-08-06 RX ORDER — PROGESTERONE 100 MG/1
CAPSULE ORAL
COMMUNITY
End: 2022-02-08

## 2021-08-06 RX ORDER — CLOBETASOL PROPIONATE 0.5 MG/G
OINTMENT TOPICAL
COMMUNITY
Start: 2021-06-25

## 2021-08-06 RX ORDER — NIACIN 500 MG
TABLET ORAL
COMMUNITY
End: 2022-02-08

## 2021-08-06 RX ORDER — ERGOCALCIFEROL 1.25 MG/1
CAPSULE ORAL
COMMUNITY
End: 2022-02-08

## 2021-08-06 NOTE — PROGRESS NOTES
"Chief Complaint  Follow-up, Med Refill, and Hyperlipidemia    Subjective          Candace Dennis presents to [facility] for follow up and med refills. Pt taking all medications daily as prescribed with good reported compliance. No issues or side effects with meds.   She was previously on fibrate and niacin for TG and HLD but is on neither now, currently using only fish oil.       Objective   Vital Signs:     /70   Pulse 70   Temp 96.6 °F (35.9 °C)   Resp 16   Ht 162.6 cm (64\")   Wt 61.7 kg (136 lb)   SpO2 98%   BMI 23.34 kg/m²     Physical Exam  Vitals and nursing note reviewed.   Constitutional:       General: She is not in acute distress.     Appearance: Normal appearance.   Cardiovascular:      Rate and Rhythm: Normal rate and regular rhythm.      Pulses: Normal pulses.      Heart sounds: Normal heart sounds. No murmur heard.     Pulmonary:      Effort: Pulmonary effort is normal. No respiratory distress.      Breath sounds: Normal breath sounds.   Abdominal:      General: Abdomen is flat. Bowel sounds are normal.      Palpations: Abdomen is soft.      Tenderness: There is no abdominal tenderness.   Musculoskeletal:      Right lower leg: No edema.      Left lower leg: No edema.   Neurological:      Mental Status: She is alert and oriented to person, place, and time. Mental status is at baseline.   Psychiatric:         Mood and Affect: Mood normal.         Behavior: Behavior normal.          Result Review :   The following data was reviewed by: Shruti Link MD on 08/06/2021:  CMP    CMP 9/3/20 4/30/21   Glucose 85 87   BUN 18 17   Creatinine 0.99 0.91   eGFR Non  Am 64 64   eGFR African Am 74 78   Sodium 142 143   Potassium 4.6 4.2   Chloride 100 105   Calcium 9.9 10.4   Total Protein 7.2 7.4   Albumin 5.1 (A) 4.90   Globulin 2.1 2.5   Total Bilirubin 0.4 0.4   Alkaline Phosphatase 74 82   AST (SGOT) 20 16   ALT (SGPT) 16 16   (A) " Abnormal value       Comments are available for some flowsheets but are not being displayed.           CBC w/diff    CBC w/Diff 9/3/20 4/30/21   WBC 5.4 5.43   RBC 4.65 4.67   Hemoglobin 13.7 14.0   Hematocrit 41.8 42.9   MCV 90 91.9   MCH 29.5 30.0   MCHC 32.8 32.6   RDW 12.9 13.0   Platelets 207 230   Neutrophil Rel % 58 55.3   Lymphocyte Rel % 30 32.2   Monocyte Rel % 8 7.9   Eosinophil Rel % 3 3.7   Basophil Rel % 1 0.7           Lipid Panel    Lipid Panel 9/3/20 4/30/21   Total Cholesterol 211 (A) 228 (A)   Triglycerides 153 (A) 222 (A)   HDL Cholesterol 50 48   VLDL Cholesterol 27 40   LDL Cholesterol  134 (A) 140 (A)   LDL/HDL Ratio 2.7    (A) Abnormal value       Comments are available for some flowsheets but are not being displayed.           TSH    TSH 4/30/21   TSH 1.830           Most Recent A1C    HGBA1C Most Recent 4/30/21   Hemoglobin A1C 5.10      Comments are available for some flowsheets but are not being displayed.              Assessment and Plan      Diagnoses and all orders for this visit:    1. Encounter for biometric screening (Primary)  -     Comprehensive Metabolic Panel  -     Lipid Panel  -     Hemoglobin A1c    2. Hypertriglyceridemia  Assessment & Plan:  CONTROLLED  - previously on fenofibrate at last lab draw, since that time, she has stopped this medication and is taking 4g of fish oil daily  - repeat FLP today on only OTC fish oil and will make recommendations for ongoing therapy  - cont with good diet and lifestyle changes including increased exercise, low chol and low fat diet, and increased fiber      Orders:  -     Comprehensive Metabolic Panel  -     Lipid Panel    3. Vitamin D deficiency  -     Vitamin D 25 Hydroxy      Follow Up   Return in about 1 year (around 8/6/2022) for Annual physical.    Patient was given instructions and counseling regarding her condition or for health maintenance advice. Please see specific information pulled into the AVS if appropriate.     Roxanna  MD Nikolay  [facility] [place] [facility] [place] [facility]  Phone: [phone]  Fax: [phone]

## 2021-08-06 NOTE — ASSESSMENT & PLAN NOTE
CONTROLLED  - previously on fenofibrate at last lab draw, since that time, she has stopped this medication and is taking 4g of fish oil daily  - repeat FLP today on only OTC fish oil and will make recommendations for ongoing therapy  - cont with good diet and lifestyle changes including increased exercise, low chol and low fat diet, and increased fiber    
25-Apr-2019 00:17

## 2021-08-07 LAB
25(OH)D3+25(OH)D2 SERPL-MCNC: 55.4 NG/ML (ref 30–100)
ALBUMIN SERPL-MCNC: 4.8 G/DL (ref 3.5–5.2)
ALBUMIN/GLOB SERPL: 2.1 G/DL
ALP SERPL-CCNC: 115 U/L (ref 39–117)
ALT SERPL-CCNC: 18 U/L (ref 1–33)
AST SERPL-CCNC: 19 U/L (ref 1–32)
BILIRUB SERPL-MCNC: 0.4 MG/DL (ref 0–1.2)
BUN SERPL-MCNC: 14 MG/DL (ref 6–20)
BUN/CREAT SERPL: 17.5 (ref 7–25)
CALCIUM SERPL-MCNC: 10.3 MG/DL (ref 8.6–10.5)
CHLORIDE SERPL-SCNC: 102 MMOL/L (ref 98–107)
CHOLEST SERPL-MCNC: 230 MG/DL (ref 0–200)
CO2 SERPL-SCNC: 29.5 MMOL/L (ref 22–29)
CREAT SERPL-MCNC: 0.8 MG/DL (ref 0.57–1)
GLOBULIN SER CALC-MCNC: 2.3 GM/DL
GLUCOSE SERPL-MCNC: 88 MG/DL (ref 65–99)
HBA1C MFR BLD: 5.1 % (ref 4.8–5.6)
HDLC SERPL-MCNC: 42 MG/DL (ref 40–60)
LDLC SERPL CALC-MCNC: 124 MG/DL (ref 0–100)
POTASSIUM SERPL-SCNC: 4.5 MMOL/L (ref 3.5–5.2)
PROT SERPL-MCNC: 7.1 G/DL (ref 6–8.5)
SODIUM SERPL-SCNC: 139 MMOL/L (ref 136–145)
TRIGL SERPL-MCNC: 361 MG/DL (ref 0–150)
VLDLC SERPL CALC-MCNC: 64 MG/DL (ref 5–40)

## 2021-08-09 ENCOUNTER — TELEPHONE (OUTPATIENT)
Dept: INTERNAL MEDICINE | Facility: CLINIC | Age: 56
End: 2021-08-09

## 2021-08-09 RX ORDER — FENOFIBRATE 145 MG/1
145 TABLET, COATED ORAL DAILY
Qty: 90 TABLET | Refills: 1 | Status: SHIPPED | OUTPATIENT
Start: 2021-08-09 | End: 2022-02-10 | Stop reason: SDUPTHER

## 2021-08-09 NOTE — TELEPHONE ENCOUNTER
PATIENT  CALLED TO CHECK IF BIOMETRICS FORM LEFT FOR DR CHRISTIE AT THE OFFICE IS READY TO BE PICKED UP.  PLEASE CALL WHEN READY, PATIENT WOULD LIKE TO COME TO THE OFFICE TO PICK IT UP  ALSO WANTED TO KNOW IF DR CHRISTIE IS GOING TO REFILL HER FENOFIBRATE    [phone]

## 2021-09-15 ENCOUNTER — APPOINTMENT (OUTPATIENT)
Dept: WOMENS IMAGING | Facility: HOSPITAL | Age: 56
End: 2021-09-15

## 2021-09-15 PROCEDURE — 77067 SCR MAMMO BI INCL CAD: CPT | Performed by: RADIOLOGY

## 2021-09-15 PROCEDURE — 77063 BREAST TOMOSYNTHESIS BI: CPT | Performed by: RADIOLOGY

## 2022-02-08 ENCOUNTER — OFFICE VISIT (OUTPATIENT)
Dept: INTERNAL MEDICINE | Facility: CLINIC | Age: 57
End: 2022-02-08

## 2022-02-08 VITALS
HEART RATE: 64 BPM | OXYGEN SATURATION: 98 % | BODY MASS INDEX: 23.05 KG/M2 | DIASTOLIC BLOOD PRESSURE: 64 MMHG | SYSTOLIC BLOOD PRESSURE: 100 MMHG | RESPIRATION RATE: 16 BRPM | HEIGHT: 64 IN | WEIGHT: 135 LBS | TEMPERATURE: 97.1 F

## 2022-02-08 DIAGNOSIS — Z12.11 COLON CANCER SCREENING: ICD-10-CM

## 2022-02-08 DIAGNOSIS — Z00.8 ENCOUNTER FOR BIOMETRIC SCREENING: ICD-10-CM

## 2022-02-08 DIAGNOSIS — E78.1 HYPERTRIGLYCERIDEMIA: Primary | ICD-10-CM

## 2022-02-08 DIAGNOSIS — E55.9 VITAMIN D DEFICIENCY: ICD-10-CM

## 2022-02-08 PROCEDURE — 99214 OFFICE O/P EST MOD 30 MIN: CPT | Performed by: INTERNAL MEDICINE

## 2022-02-08 RX ORDER — ALBUTEROL SULFATE 90 UG/1
AEROSOL, METERED RESPIRATORY (INHALATION)
COMMUNITY
Start: 2022-01-28

## 2022-02-08 NOTE — PROGRESS NOTES
"Chief Complaint  Follow-up, Med Refill, Hyperlipidemia, and Biometric Screening    Subjective          Candace Dennis presents to [facility] for med refills and follow up. Pt taking all medications daily as prescribed with good reported compliance. No issues or side effects with meds.       Objective   Vital Signs:     /64   Pulse 64   Temp 97.1 °F (36.2 °C)   Resp 16   Ht 162.6 cm (64\")   Wt 61.2 kg (135 lb)   SpO2 98%   BMI 23.17 kg/m²     Physical Exam  Vitals and nursing note reviewed.   Constitutional:       General: She is not in acute distress.     Appearance: Normal appearance.   Cardiovascular:      Rate and Rhythm: Normal rate and regular rhythm.      Pulses: Normal pulses.      Heart sounds: Normal heart sounds. No murmur heard.      Pulmonary:      Effort: Pulmonary effort is normal. No respiratory distress.      Breath sounds: Normal breath sounds.   Abdominal:      General: Abdomen is flat. Bowel sounds are normal.      Palpations: Abdomen is soft.      Tenderness: There is no abdominal tenderness.   Musculoskeletal:      Right lower leg: No edema.      Left lower leg: No edema.   Neurological:      Mental Status: She is alert and oriented to person, place, and time. Mental status is at baseline.   Psychiatric:         Mood and Affect: Mood normal.         Behavior: Behavior normal.          Result Review :   The following data was reviewed by: Shruti Link MD on 02/08/2022:  CMP    CMP 4/30/21 8/6/21   Glucose 87 88   BUN 17 14   Creatinine 0.91 0.80   eGFR Non  Am 64 74   eGFR African Am 78 90   Sodium 143 139   Potassium 4.2 4.5   Chloride 105 102   Calcium 10.4 10.3   Total Protein 7.4 7.1   Albumin 4.90 4.80   Globulin 2.5 2.3   Total Bilirubin 0.4 0.4   Alkaline Phosphatase 82 115   AST (SGOT) 16 19   ALT (SGPT) 16 18      Comments are available for some flowsheets but are not being displayed.           CBC w/diff    CBC " w/Diff 4/30/21   WBC 5.43   RBC 4.67   Hemoglobin 14.0   Hematocrit 42.9   MCV 91.9   MCH 30.0   MCHC 32.6   RDW 13.0   Platelets 230   Neutrophil Rel % 55.3   Lymphocyte Rel % 32.2   Monocyte Rel % 7.9   Eosinophil Rel % 3.7   Basophil Rel % 0.7           Lipid Panel    Lipid Panel 4/30/21 8/6/21   Total Cholesterol 228 (A) 230 (A)   Triglycerides 222 (A) 361 (A)   HDL Cholesterol 48 42   VLDL Cholesterol 40 64 (A)   LDL Cholesterol  140 (A) 124 (A)   (A) Abnormal value       Comments are available for some flowsheets but are not being displayed.           TSH    TSH 4/30/21   TSH 1.830           Most Recent A1C    HGBA1C Most Recent 8/6/21   Hemoglobin A1C 5.10      Comments are available for some flowsheets but are not being displayed.                Assessment and Plan      Diagnoses and all orders for this visit:    1. Hypertriglyceridemia (Primary)  Assessment & Plan:  CONTROLLED  - currently on fenofibrate and fish oil supplementation daily  - repeat FLP today   - cont with good diet and lifestyle changes including increased exercise, low chol and low fat diet, and increased fiber      Orders:  -     Comprehensive Metabolic Panel  -     Lipid Panel    2. Vitamin D deficiency  -     Vitamin D 25 Hydroxy    3. Encounter for biometric screening  -     Comprehensive Metabolic Panel  -     Hemoglobin A1c    4. Colon cancer screening  -     Cologuard - Stool, Per Rectum; Future      Follow Up   Return in about 6 months (around 8/8/2022) for follow up.    Patient was given instructions and counseling regarding her condition or for health maintenance advice. Please see specific information pulled into the AVS if appropriate.     Roxanna Link MD  [facility] [place] [facility]  Phone: [phone]  Fax: [phone]

## 2022-02-08 NOTE — ASSESSMENT & PLAN NOTE
CONTROLLED  - currently on fenofibrate and fish oil supplementation daily  - repeat FLP today   - cont with good diet and lifestyle changes including increased exercise, low chol and low fat diet, and increased fiber

## 2022-02-09 LAB
25(OH)D3+25(OH)D2 SERPL-MCNC: 57 NG/ML (ref 30–100)
ALBUMIN SERPL-MCNC: 4.5 G/DL (ref 3.8–4.9)
ALBUMIN/GLOB SERPL: 1.9 {RATIO} (ref 1.2–2.2)
ALP SERPL-CCNC: 77 IU/L (ref 44–121)
ALT SERPL-CCNC: 18 IU/L (ref 0–32)
AST SERPL-CCNC: 18 IU/L (ref 0–40)
BILIRUB SERPL-MCNC: 0.3 MG/DL (ref 0–1.2)
BUN SERPL-MCNC: 15 MG/DL (ref 6–24)
BUN/CREAT SERPL: 17 (ref 9–23)
CALCIUM SERPL-MCNC: 9.7 MG/DL (ref 8.7–10.2)
CHLORIDE SERPL-SCNC: 105 MMOL/L (ref 96–106)
CHOLEST SERPL-MCNC: 241 MG/DL (ref 100–199)
CO2 SERPL-SCNC: 23 MMOL/L (ref 20–29)
CREAT SERPL-MCNC: 0.89 MG/DL (ref 0.57–1)
GLOBULIN SER CALC-MCNC: 2.4 G/DL (ref 1.5–4.5)
GLUCOSE SERPL-MCNC: 89 MG/DL (ref 65–99)
HBA1C MFR BLD: 5.3 % (ref 4.8–5.6)
HDLC SERPL-MCNC: 48 MG/DL
LDLC SERPL CALC-MCNC: 162 MG/DL (ref 0–99)
POTASSIUM SERPL-SCNC: 4.3 MMOL/L (ref 3.5–5.2)
PROT SERPL-MCNC: 6.9 G/DL (ref 6–8.5)
SODIUM SERPL-SCNC: 142 MMOL/L (ref 134–144)
TRIGL SERPL-MCNC: 172 MG/DL (ref 0–149)
VLDLC SERPL CALC-MCNC: 31 MG/DL (ref 5–40)

## 2022-02-10 RX ORDER — FENOFIBRATE 145 MG/1
145 TABLET, COATED ORAL DAILY
Qty: 90 TABLET | Refills: 1 | Status: SHIPPED | OUTPATIENT
Start: 2022-02-10 | End: 2022-07-28 | Stop reason: SDUPTHER

## 2022-02-10 NOTE — TELEPHONE ENCOUNTER
Rx Refill Note  Requested Prescriptions     Pending Prescriptions Disp Refills   • fenofibrate (TRICOR) 145 MG tablet 90 tablet 1     Sig: Take 1 tablet by mouth Daily.      Last office visit with prescribing clinician: 2/8/2022      Next office visit with prescribing clinician: 8/8/2022            Kiara Chacon MA  02/10/22, 08:19 EST

## 2022-03-08 ENCOUNTER — TELEPHONE (OUTPATIENT)
Dept: INTERNAL MEDICINE | Facility: CLINIC | Age: 57
End: 2022-03-08

## 2022-03-08 NOTE — TELEPHONE ENCOUNTER
I S/W LUIS DANIEL AT [facility] AND HE SAID THAT CHANA'S COLOGUARD RESULT WAS NEGATIVE AND HE WOULD FAX THE RESULTS TO OUR OFFICE.

## 2022-07-14 ENCOUNTER — OFFICE VISIT (OUTPATIENT)
Dept: INTERNAL MEDICINE | Facility: CLINIC | Age: 57
End: 2022-07-14

## 2022-07-14 VITALS
HEART RATE: 61 BPM | WEIGHT: 132.8 LBS | DIASTOLIC BLOOD PRESSURE: 76 MMHG | BODY MASS INDEX: 22.67 KG/M2 | TEMPERATURE: 95.1 F | HEIGHT: 64 IN | RESPIRATION RATE: 16 BRPM | SYSTOLIC BLOOD PRESSURE: 118 MMHG | OXYGEN SATURATION: 95 %

## 2022-07-14 DIAGNOSIS — Z00.00 ROUTINE GENERAL MEDICAL EXAMINATION AT A HEALTH CARE FACILITY: Primary | ICD-10-CM

## 2022-07-14 LAB
BILIRUB BLD-MCNC: NEGATIVE MG/DL
CLARITY, POC: CLEAR
COLOR UR: YELLOW
EXPIRATION DATE: NORMAL
GLUCOSE UR STRIP-MCNC: NEGATIVE MG/DL
KETONES UR QL: NEGATIVE
LEUKOCYTE EST, POC: NEGATIVE
Lab: NORMAL
NITRITE UR-MCNC: NEGATIVE MG/ML
PH UR: 5 [PH] (ref 5–8)
PROT UR STRIP-MCNC: NEGATIVE MG/DL
RBC # UR STRIP: NEGATIVE /UL
SP GR UR: 1.01 (ref 1–1.03)
UROBILINOGEN UR QL: NORMAL

## 2022-07-14 PROCEDURE — 99396 PREV VISIT EST AGE 40-64: CPT | Performed by: INTERNAL MEDICINE

## 2022-07-14 PROCEDURE — 81003 URINALYSIS AUTO W/O SCOPE: CPT | Performed by: INTERNAL MEDICINE

## 2022-07-14 NOTE — PROGRESS NOTES
"Chief Complaint   Patient presents with   • Annual Exam       Subjective   History of Present Illness    Candace Dennis is a 56 y.o. female here for annual wellness. Pt does not have acute issues to discuss today. States overall things are going well. Taking all meds as prescribed without any issues.     The following portions of the patient's history were reviewed and updated as appropriate: allergies, current medications, past family history, past medical history, past social history, past surgical history, and problem list.    Patient Care Team:  Shruti Link MD as PCP - General (Internal Medicine & Pediatrics)  Sarina Leigh MD as Consulting Physician (Obstetrics and Gynecology)    Review of Systems   Constitutional: Negative for activity change, chills and fever.   HENT: Negative for congestion, ear pain, rhinorrhea and sore throat.    Eyes: Negative for double vision, pain and visual disturbance.   Respiratory: Negative for cough, shortness of breath and wheezing.    Cardiovascular: Negative for chest pain, palpitations and leg swelling.   Gastrointestinal: Negative for abdominal pain, blood in stool, constipation, diarrhea, nausea and vomiting.   Endocrine: Negative for cold intolerance and heat intolerance.   Genitourinary: Negative for difficulty urinating, dysuria and frequency.   Musculoskeletal: Negative for arthralgias and myalgias.   Skin: Negative for rash and skin lesions.   Neurological: Negative for dizziness, tremors and headache.   Hematological: Negative for adenopathy. Does not bruise/bleed easily.   Psychiatric/Behavioral: Negative for behavioral problems and suicidal ideas.       Body mass index is 22.8 kg/m².   Vitals:    07/14/22 1059   BP: 118/76   BP Location: Left arm   Patient Position: Sitting   Cuff Size: Large Adult   Pulse: 61   Resp: 16   Temp: 95.1 °F (35.1 °C)   TempSrc: Temporal   SpO2: 95%   Weight: 60.2 kg (132 lb 12.8 oz)   Height: 162.6 cm (64\")        Objective "   Physical Exam  Vitals and nursing note reviewed.   Constitutional:       General: She is not in acute distress.     Appearance: Normal appearance.   HENT:      Head: Normocephalic and atraumatic.      Right Ear: Tympanic membrane and ear canal normal.      Left Ear: Tympanic membrane and ear canal normal.      Nose: Nose normal.      Mouth/Throat:      Mouth: Mucous membranes are moist.      Pharynx: Oropharynx is clear.   Eyes:      Extraocular Movements: Extraocular movements intact.      Conjunctiva/sclera: Conjunctivae normal.      Pupils: Pupils are equal, round, and reactive to light.   Cardiovascular:      Rate and Rhythm: Normal rate and regular rhythm.      Heart sounds: Normal heart sounds. No murmur heard.  Pulmonary:      Effort: Pulmonary effort is normal. No respiratory distress.      Breath sounds: Normal breath sounds. No wheezing or rhonchi.   Abdominal:      General: Bowel sounds are normal. There is no distension.      Palpations: Abdomen is soft. There is no mass.      Tenderness: There is no abdominal tenderness.      Comments: no hepatosplenomegaly   Musculoskeletal:         General: No deformity. Normal range of motion.      Cervical back: Normal range of motion and neck supple.   Skin:     General: Skin is warm and dry.      Capillary Refill: Capillary refill takes less than 2 seconds.      Findings: No lesion or rash.   Neurological:      General: No focal deficit present.      Mental Status: She is alert and oriented to person, place, and time.      Cranial Nerves: No cranial nerve deficit.   Psychiatric:         Mood and Affect: Mood normal.         Behavior: Behavior normal.         Judgment: Judgment normal.         Brief Urine Lab Results  (Last result in the past 365 days)      Color   Clarity   Blood   Leuk Est   Nitrite   Protein   CREAT   Urine HCG        07/14/22 1234 Yellow   Clear   Negative   Negative   Negative   Negative                   Current Outpatient Medications:   •   Cholecalciferol (vitamin D3) 125 MCG (5000 UT) capsule capsule, Take 5,000 Units by mouth Daily., Disp: , Rfl:   •  clobetasol (TEMOVATE) 0.05 % ointment, APPLY PEA SIZED TOPICALLY TO AREA TWICE DAILY FOR 4 WEEKS THEN DAILY FOR 4 WEEKS THEN ONCE A WEEK, Disp: , Rfl:   •  fenofibrate (TRICOR) 145 MG tablet, Take 1 tablet by mouth Daily., Disp: 90 tablet, Rfl: 1  •  Magnesium 100 MG capsule, , Disp: , Rfl:   •  vitamin C (ASCORBIC ACID) 250 MG tablet, Take 250 mg by mouth Daily., Disp: , Rfl:   •  albuterol sulfate  (90 Base) MCG/ACT inhaler, INHALE 2 PUFFS BY MOUTH EVERY 6 HOURS FOR 14 DAYS, Disp: , Rfl:      Lab Results   Component Value Date    HGBA1C 5.3 02/08/2022    TSH 1.830 04/30/2021    FOZS67SH 57.0 02/08/2022        Health Maintenance   Topic Date Due   • MAMMOGRAM  Never done   • ZOSTER VACCINE (1 of 2) Never done   • PAP SMEAR  Never done   • INFLUENZA VACCINE  10/01/2022   • LIPID PANEL  02/08/2023   • TDAP/TD VACCINES (2 - Td or Tdap) 10/06/2030        Immunization History   Administered Date(s) Administered   • COVID-19 (PFIZER) PURPLE CAP 03/09/2021, 03/30/2021, 01/04/2022   • Td, Not Adsorbed 10/01/2008   • Tdap 10/06/2020       Assessment & Plan     Annual Wellness  - Labs ordered today including CBC, CMP, Fasting lipid panel, HgbA1C, TSH and Vit D. Will call with results once available and make follow up plan and med changes as appropriate.   - Cont current medications for chronic medical issues, refills sent as needed today.   - EKG not indicated  - PAP smear up to date and managed by gynecology. Last 2020 and negative. Will obtain records from GYN office  - Mammo up to date and managed by gynecology. Last 08/2021 and negative. Will obtain records from GYN officew  - Cscope up to date and normal. Last done 03/2022 with Cologuard testing. Scanned into chart  - DEXA not yet indicated, due at 65 yo  - Lung CA screening not indicated  - Immunizations: Flu shot due Fall 2022. COVID series and  booster x 1 complete. TDaP done 2020. Shingrix vaccine discussed, pt would like to hold off at this time.    - Depression screen reviewed with patient and negative.  - Advised behavioral modifications including dietary changes and increased exercise with goal of healthy weight and lifestyle.       Return in about 6 months (around 1/14/2023) for follow up.     Roxanna Link MD  [facility] [place] [facility] [place] [facility]  Phone: [phone]  Fax: [phone]

## 2022-07-15 LAB
25(OH)D3+25(OH)D2 SERPL-MCNC: 46 NG/ML (ref 30–100)
ALBUMIN SERPL-MCNC: 4.7 G/DL (ref 3.8–4.9)
ALBUMIN/GLOB SERPL: 2 {RATIO} (ref 1.2–2.2)
ALP SERPL-CCNC: 83 IU/L (ref 44–121)
ALT SERPL-CCNC: 14 IU/L (ref 0–32)
AST SERPL-CCNC: 16 IU/L (ref 0–40)
BASOPHILS # BLD AUTO: 0 X10E3/UL (ref 0–0.2)
BASOPHILS NFR BLD AUTO: 1 %
BILIRUB SERPL-MCNC: 0.4 MG/DL (ref 0–1.2)
BUN SERPL-MCNC: 19 MG/DL (ref 6–24)
BUN/CREAT SERPL: 22 (ref 9–23)
CALCIUM SERPL-MCNC: 9.7 MG/DL (ref 8.7–10.2)
CHLORIDE SERPL-SCNC: 103 MMOL/L (ref 96–106)
CHOLEST SERPL-MCNC: 223 MG/DL (ref 100–199)
CO2 SERPL-SCNC: 24 MMOL/L (ref 20–29)
CREAT SERPL-MCNC: 0.87 MG/DL (ref 0.57–1)
EGFRCR SERPLBLD CKD-EPI 2021: 78 ML/MIN/1.73
EOSINOPHIL # BLD AUTO: 0.1 X10E3/UL (ref 0–0.4)
EOSINOPHIL NFR BLD AUTO: 3 %
ERYTHROCYTE [DISTWIDTH] IN BLOOD BY AUTOMATED COUNT: 13 % (ref 11.7–15.4)
GLOBULIN SER CALC-MCNC: 2.3 G/DL (ref 1.5–4.5)
GLUCOSE SERPL-MCNC: 83 MG/DL (ref 65–99)
HBA1C MFR BLD: 5.2 % (ref 4.8–5.6)
HCT VFR BLD AUTO: 40.5 % (ref 34–46.6)
HDLC SERPL-MCNC: 44 MG/DL
HGB BLD-MCNC: 13.5 G/DL (ref 11.1–15.9)
IMM GRANULOCYTES # BLD AUTO: 0 X10E3/UL (ref 0–0.1)
IMM GRANULOCYTES NFR BLD AUTO: 0 %
LDLC SERPL CALC-MCNC: 147 MG/DL (ref 0–99)
LYMPHOCYTES # BLD AUTO: 1.8 X10E3/UL (ref 0.7–3.1)
LYMPHOCYTES NFR BLD AUTO: 35 %
MCH RBC QN AUTO: 29.3 PG (ref 26.6–33)
MCHC RBC AUTO-ENTMCNC: 33.3 G/DL (ref 31.5–35.7)
MCV RBC AUTO: 88 FL (ref 79–97)
MONOCYTES # BLD AUTO: 0.4 X10E3/UL (ref 0.1–0.9)
MONOCYTES NFR BLD AUTO: 8 %
NEUTROPHILS # BLD AUTO: 2.8 X10E3/UL (ref 1.4–7)
NEUTROPHILS NFR BLD AUTO: 53 %
PLATELET # BLD AUTO: 231 X10E3/UL (ref 150–450)
POTASSIUM SERPL-SCNC: 4.5 MMOL/L (ref 3.5–5.2)
PROT SERPL-MCNC: 7 G/DL (ref 6–8.5)
RBC # BLD AUTO: 4.6 X10E6/UL (ref 3.77–5.28)
SODIUM SERPL-SCNC: 141 MMOL/L (ref 134–144)
TRIGL SERPL-MCNC: 178 MG/DL (ref 0–149)
TSH SERPL DL<=0.005 MIU/L-ACNC: 2.42 UIU/ML (ref 0.45–4.5)
VLDLC SERPL CALC-MCNC: 32 MG/DL (ref 5–40)
WBC # BLD AUTO: 5.2 X10E3/UL (ref 3.4–10.8)

## 2022-07-28 RX ORDER — FENOFIBRATE 145 MG/1
145 TABLET, COATED ORAL DAILY
Qty: 90 TABLET | Refills: 1 | Status: SHIPPED | OUTPATIENT
Start: 2022-07-28 | End: 2023-01-24 | Stop reason: SDUPTHER

## 2022-07-28 NOTE — TELEPHONE ENCOUNTER
Rx Refill Note  Requested Prescriptions     Pending Prescriptions Disp Refills   • fenofibrate (TRICOR) 145 MG tablet 90 tablet 1     Sig: Take 1 tablet by mouth Daily.      Last office visit with prescribing clinician: 7/14/2022      Next office visit with prescribing clinician: 1/16/2023            Kiara Chacon MA  07/28/22, 08:38 EDT

## 2022-09-29 ENCOUNTER — APPOINTMENT (OUTPATIENT)
Dept: WOMENS IMAGING | Facility: HOSPITAL | Age: 57
End: 2022-09-29

## 2022-09-29 PROCEDURE — 77067 SCR MAMMO BI INCL CAD: CPT | Performed by: RADIOLOGY

## 2022-09-29 PROCEDURE — 77063 BREAST TOMOSYNTHESIS BI: CPT | Performed by: RADIOLOGY

## 2023-01-16 ENCOUNTER — OFFICE VISIT (OUTPATIENT)
Dept: INTERNAL MEDICINE | Facility: CLINIC | Age: 58
End: 2023-01-16
Payer: COMMERCIAL

## 2023-01-16 VITALS
OXYGEN SATURATION: 97 % | SYSTOLIC BLOOD PRESSURE: 124 MMHG | RESPIRATION RATE: 16 BRPM | TEMPERATURE: 97.5 F | HEART RATE: 91 BPM | DIASTOLIC BLOOD PRESSURE: 76 MMHG | HEIGHT: 64 IN | BODY MASS INDEX: 22.8 KG/M2

## 2023-01-16 DIAGNOSIS — E78.1 HYPERTRIGLYCERIDEMIA: Primary | ICD-10-CM

## 2023-01-16 PROCEDURE — 99213 OFFICE O/P EST LOW 20 MIN: CPT | Performed by: INTERNAL MEDICINE

## 2023-01-16 NOTE — ASSESSMENT & PLAN NOTE
CONTROLLED  - currently on fenofibrate daily  - repeat FLP today   - cont with good diet and lifestyle changes including increased exercise, low chol and low fat diet, and increased fiber  - LDL slightly elevated but coronary calcium score showed a score of 0, so at this time not starting statin prematurely but does have a FHx of CAD and recurrent MI

## 2023-01-16 NOTE — PROGRESS NOTES
"Chief Complaint  Follow-up on HLD    Subjective          Candace Dennis presents to [facility] for follow up and med refills. Pt taking all medications daily as prescribed with good reported compliance. No issues or side effects with meds.       Objective   Vital Signs:     /76   Pulse 91   Temp 97.5 °F (36.4 °C)   Resp 16   Ht 162.6 cm (64\")   SpO2 97%   BMI 22.80 kg/m²     Physical Exam  Vitals and nursing note reviewed.   Constitutional:       General: She is not in acute distress.     Appearance: Normal appearance.   Cardiovascular:      Rate and Rhythm: Normal rate and regular rhythm.      Pulses: Normal pulses.      Heart sounds: Normal heart sounds. No murmur heard.  Pulmonary:      Effort: Pulmonary effort is normal. No respiratory distress.      Breath sounds: Normal breath sounds.   Abdominal:      General: Abdomen is flat. Bowel sounds are normal.      Palpations: Abdomen is soft.      Tenderness: There is no abdominal tenderness.   Musculoskeletal:      Right lower leg: No edema.      Left lower leg: No edema.   Neurological:      Mental Status: She is alert and oriented to person, place, and time. Mental status is at baseline.   Psychiatric:         Mood and Affect: Mood normal.         Behavior: Behavior normal.          Result Review :   The following data was reviewed by: Shruti Link MD on 01/16/2023:  CMP    CMP 2/8/22 7/14/22   Glucose 89 83   BUN 15 19   Creatinine 0.89 0.87   eGFR Non  Am 72    eGFR  Am 83    Sodium 142 141   Potassium 4.3 4.5   Chloride 105 103   Calcium 9.7 9.7   Total Protein 6.9 7.0   Albumin 4.5 4.7   Globulin 2.4 2.3   Total Bilirubin 0.3 0.4   Alkaline Phosphatase 77 83   AST (SGOT) 18 16   ALT (SGPT) 18 14   BUN/Creatinine Ratio 17 22      Comments are available for some flowsheets but are not being displayed.           CBC w/diff    CBC w/Diff 7/14/22   WBC 5.2   RBC 4.60   Hemoglobin " 13.5   Hematocrit 40.5   MCV 88   MCH 29.3   MCHC 33.3   RDW 13.0   Platelets 231   Neutrophil Rel % 53   Lymphocyte Rel % 35   Monocyte Rel % 8   Eosinophil Rel % 3   Basophil Rel % 1           Lipid Panel    Lipid Panel 2/8/22 7/14/22   Total Cholesterol 241 (A) 223 (A)   Triglycerides 172 (A) 178 (A)   HDL Cholesterol 48 44   VLDL Cholesterol 31 32   LDL Cholesterol  162 (A) 147 (A)   (A) Abnormal value            TSH    TSH 7/14/22   TSH 2.420           Most Recent A1C    HGBA1C Most Recent 7/14/22   Hemoglobin A1C 5.2      Comments are available for some flowsheets but are not being displayed.           Data reviewed: coronary calcium score            Assessment and Plan      Diagnoses and all orders for this visit:    1. Hypertriglyceridemia (Primary)  Assessment & Plan:  CONTROLLED  - currently on fenofibrate and fish oil supplementation daily  - repeat FLP today   - cont with good diet and lifestyle changes including increased exercise, low chol and low fat diet, and increased fiber  - LDL slightly elevated but coronary calcium score showed a score of 0, so at this time not starting statin prematurely but does have a FHx of CAD and recurrent MI      Orders:  -     Comprehensive Metabolic Panel  -     Lipid Panel      Follow Up   Return in about 6 months (around 7/16/2023) for Annual physical.    Patient was given instructions and counseling regarding her condition or for health maintenance advice. Please see specific information pulled into the AVS if appropriate.     Roxanna Link MD  [facility] [place] [facility] [place] [facility]  Phone: [phone]  Fax: [phone]

## 2023-01-17 LAB
ALBUMIN SERPL-MCNC: 4.6 G/DL (ref 3.5–5.2)
ALBUMIN/GLOB SERPL: 1.9 G/DL
ALP SERPL-CCNC: 71 U/L (ref 39–117)
ALT SERPL-CCNC: 17 U/L (ref 1–33)
AST SERPL-CCNC: 19 U/L (ref 1–32)
BILIRUB SERPL-MCNC: 0.4 MG/DL (ref 0–1.2)
BUN SERPL-MCNC: 20 MG/DL (ref 6–20)
BUN/CREAT SERPL: 20.6 (ref 7–25)
CALCIUM SERPL-MCNC: 9.9 MG/DL (ref 8.6–10.5)
CHLORIDE SERPL-SCNC: 103 MMOL/L (ref 98–107)
CHOLEST SERPL-MCNC: 211 MG/DL (ref 0–200)
CO2 SERPL-SCNC: 29.8 MMOL/L (ref 22–29)
CREAT SERPL-MCNC: 0.97 MG/DL (ref 0.57–1)
EGFRCR SERPLBLD CKD-EPI 2021: 67.9 ML/MIN/1.73
GLOBULIN SER CALC-MCNC: 2.4 GM/DL
GLUCOSE SERPL-MCNC: 94 MG/DL (ref 65–99)
HDLC SERPL-MCNC: 49 MG/DL (ref 40–60)
LDLC SERPL CALC-MCNC: 139 MG/DL (ref 0–100)
POTASSIUM SERPL-SCNC: 4.1 MMOL/L (ref 3.5–5.2)
PROT SERPL-MCNC: 7 G/DL (ref 6–8.5)
SODIUM SERPL-SCNC: 143 MMOL/L (ref 136–145)
TRIGL SERPL-MCNC: 127 MG/DL (ref 0–150)
VLDLC SERPL CALC-MCNC: 23 MG/DL (ref 5–40)

## 2023-01-24 RX ORDER — FENOFIBRATE 145 MG/1
145 TABLET, COATED ORAL DAILY
Qty: 90 TABLET | Refills: 1 | Status: SHIPPED | OUTPATIENT
Start: 2023-01-24

## 2023-01-24 NOTE — TELEPHONE ENCOUNTER
Rx Refill Note  Requested Prescriptions     Pending Prescriptions Disp Refills   • fenofibrate (TRICOR) 145 MG tablet 90 tablet 1     Sig: Take 1 tablet by mouth Daily.      Last office visit with prescribing clinician: 1/16/2023   Last telemedicine visit with prescribing clinician: Visit date not found   Next office visit with prescribing clinician: 7/24/2023                         Would you like a call back once the refill request has been completed: [] Yes [] No    If the office needs to give you a call back, can they leave a voicemail: [] Yes [] No    Kiara Chacon MA  01/24/23, 15:43 EST

## 2023-07-24 ENCOUNTER — OFFICE VISIT (OUTPATIENT)
Dept: INTERNAL MEDICINE | Facility: CLINIC | Age: 58
End: 2023-07-24
Payer: COMMERCIAL

## 2023-07-24 VITALS
WEIGHT: 129 LBS | HEIGHT: 64 IN | DIASTOLIC BLOOD PRESSURE: 64 MMHG | SYSTOLIC BLOOD PRESSURE: 104 MMHG | OXYGEN SATURATION: 99 % | HEART RATE: 90 BPM | BODY MASS INDEX: 22.02 KG/M2 | RESPIRATION RATE: 16 BRPM | TEMPERATURE: 96.3 F

## 2023-07-24 DIAGNOSIS — E55.9 VITAMIN D DEFICIENCY: ICD-10-CM

## 2023-07-24 DIAGNOSIS — Z00.00 ROUTINE GENERAL MEDICAL EXAMINATION AT A HEALTH CARE FACILITY: Primary | ICD-10-CM

## 2023-07-24 LAB
BILIRUB BLD-MCNC: NEGATIVE MG/DL
CLARITY, POC: CLEAR
COLOR UR: YELLOW
EXPIRATION DATE: NORMAL
GLUCOSE UR STRIP-MCNC: NEGATIVE MG/DL
KETONES UR QL: NEGATIVE
LEUKOCYTE EST, POC: NEGATIVE
Lab: NORMAL
NITRITE UR-MCNC: NEGATIVE MG/ML
PH UR: 6.5 [PH] (ref 5–8)
PROT UR STRIP-MCNC: NEGATIVE MG/DL
RBC # UR STRIP: NEGATIVE /UL
SP GR UR: 1.01 (ref 1–1.03)
UROBILINOGEN UR QL: NORMAL

## 2023-07-24 PROCEDURE — 99396 PREV VISIT EST AGE 40-64: CPT | Performed by: INTERNAL MEDICINE

## 2023-07-24 PROCEDURE — 81003 URINALYSIS AUTO W/O SCOPE: CPT | Performed by: INTERNAL MEDICINE

## 2023-07-24 NOTE — PROGRESS NOTES
Chief Complaint   Patient presents with    Annual Exam       Subjective   History of Present Illness    Candace Dennis is a 57 y.o. female here for annual wellness. Pt does not have acute issues to discuss today. States overall things are going well. Taking all meds as prescribed without any issues.   Pt is on fenofibrate for HTG, did stop it 3 weeks ago to see if OTC supplement she has started would help lower it.     The following portions of the patient's history were reviewed and updated as appropriate: allergies, current medications, past family history, past medical history, past social history, past surgical history, and problem list.    Patient Care Team:  Shruti Link MD as PCP - General (Internal Medicine & Pediatrics)  Sarina Leigh MD as Consulting Physician (Obstetrics and Gynecology)    Review of Systems   Constitutional:  Negative for activity change, chills and fever.   HENT:  Negative for congestion, ear pain, rhinorrhea and sore throat.    Eyes:  Negative for double vision, pain and visual disturbance.   Respiratory:  Negative for cough, shortness of breath and wheezing.    Cardiovascular:  Negative for chest pain, palpitations and leg swelling.   Gastrointestinal:  Negative for abdominal pain, blood in stool, constipation, diarrhea, nausea and vomiting.   Endocrine: Negative for cold intolerance and heat intolerance.   Genitourinary:  Negative for difficulty urinating, dysuria and frequency.   Musculoskeletal:  Negative for arthralgias and myalgias.   Skin:  Negative for rash and skin lesions.   Neurological:  Negative for dizziness, tremors and headache.   Hematological:  Negative for adenopathy. Does not bruise/bleed easily.   Psychiatric/Behavioral:  Negative for behavioral problems and suicidal ideas.      Body mass index is 22.14 kg/m².   Vitals:    07/24/23 0807   BP: 104/64   Pulse: 90   Resp: 16   Temp: 96.3 °F (35.7 °C)   SpO2: 99%   Weight: 58.5 kg (129 lb)   Height: 162.6 cm  "(64\")        Objective   Physical Exam  Vitals and nursing note reviewed.   Constitutional:       General: She is not in acute distress.     Appearance: Normal appearance.   HENT:      Head: Normocephalic and atraumatic.      Right Ear: Tympanic membrane and ear canal normal.      Left Ear: Tympanic membrane and ear canal normal.      Nose: Nose normal.      Mouth/Throat:      Mouth: Mucous membranes are moist.      Pharynx: Oropharynx is clear.   Eyes:      Extraocular Movements: Extraocular movements intact.      Conjunctiva/sclera: Conjunctivae normal.      Pupils: Pupils are equal, round, and reactive to light.   Cardiovascular:      Rate and Rhythm: Normal rate and regular rhythm.      Heart sounds: Normal heart sounds. No murmur heard.  Pulmonary:      Effort: Pulmonary effort is normal. No respiratory distress.      Breath sounds: Normal breath sounds. No wheezing or rhonchi.   Abdominal:      General: Bowel sounds are normal. There is no distension.      Palpations: Abdomen is soft. There is no mass.      Tenderness: There is no abdominal tenderness.      Comments: no hepatosplenomegaly   Musculoskeletal:         General: No deformity. Normal range of motion.      Cervical back: Normal range of motion and neck supple.   Skin:     General: Skin is warm and dry.      Capillary Refill: Capillary refill takes less than 2 seconds.      Findings: No lesion or rash.   Neurological:      General: No focal deficit present.      Mental Status: She is alert and oriented to person, place, and time.      Cranial Nerves: No cranial nerve deficit.   Psychiatric:         Mood and Affect: Mood normal.         Behavior: Behavior normal.         Judgment: Judgment normal.       Brief Urine Lab Results  (Last result in the past 365 days)        Color   Clarity   Blood   Leuk Est   Nitrite   Protein   CREAT   Urine HCG        07/24/23 0927 Yellow   Clear   Negative   Negative   Negative   Negative                     Current " Outpatient Medications:     Unable to find, VEGAN EPA/DHA, Disp: , Rfl:     albuterol sulfate  (90 Base) MCG/ACT inhaler, INHALE 2 PUFFS BY MOUTH EVERY 6 HOURS FOR 14 DAYS, Disp: , Rfl:     Cholecalciferol (vitamin D3) 125 MCG (5000 UT) capsule capsule, Take 5,000 Units by mouth Daily., Disp: , Rfl:     clobetasol (TEMOVATE) 0.05 % ointment, APPLY PEA SIZED TOPICALLY TO AREA TWICE DAILY FOR 4 WEEKS THEN DAILY FOR 4 WEEKS THEN ONCE A WEEK, Disp: , Rfl:     fenofibrate (TRICOR) 145 MG tablet, Take 1 tablet by mouth Daily., Disp: 90 tablet, Rfl: 1    Magnesium 100 MG capsule, , Disp: , Rfl:     vitamin C (ASCORBIC ACID) 250 MG tablet, Take 250 mg by mouth Daily., Disp: , Rfl:      Lab Results   Component Value Date    HGBA1C 5.2 07/14/2022    TSH 2.420 07/14/2022    ETUA11AK 46.0 07/14/2022        Health Maintenance   Topic Date Due    ZOSTER VACCINE (1 of 2) Never done    PAP SMEAR  Never done    COVID-19 Vaccine (4 - Pfizer series) 03/01/2022    MAMMOGRAM  09/15/2023    INFLUENZA VACCINE  10/01/2023    LIPID PANEL  01/16/2024    ANNUAL PHYSICAL  07/24/2024    COLORECTAL CANCER SCREENING  05/19/2025    TDAP/TD VACCINES (2 - Td or Tdap) 10/06/2030    HEPATITIS C SCREENING  Completed    Pneumococcal Vaccine 0-64  Aged Out        Immunization History   Administered Date(s) Administered    COVID-19 (PFIZER) Purple Cap Monovalent 03/09/2021, 03/30/2021, 01/04/2022    Td, Not Adsorbed 10/01/2008    Tdap 10/06/2020       Assessment & Plan     Annual Wellness  - Labs ordered today including CBC, CMP, Fasting lipid panel, HgbA1C, TSH, and Vit D. Will call with results once available and make follow up plan and med changes as appropriate.   - Cont current medications for chronic medical issues, refills sent as needed today.   - EKG not indicated  - PAP smear up to date and managed by gynecology. Last 08/2022 and reportedly normal, will obtain report  - Mammo up to date and managed by gynecology. Last 08/22 and reportedly  normal, will obtain report  - Cscope up to date and normal. Last done 2022 with cologuard, negative, repeat screening due in 2025 and will do with Cscope given change in Fhx with Dad's passing  - DEXA up to date and managed by gynecology. Uncertain of date, will attempt to obtain records  - Lung CA screening not indicated  - Immunizations: Flu shot due Fall 2023. COVID series and booster x 1 completed, bivalent vaccine recommended. TDaP done 2020, UTD. Discussed shingrix, will defer today and follow up at next visit.    - Depression screen reviewed with patient and negative.  - Advised behavioral modifications including dietary changes and increased exercise with goal of healthy weight and lifestyle.       Return in about 1 year (around 7/24/2024) for Annual physical.     Roxanna Likn MD  [facility] [place] [facility]  Phone: [phone]  Fax: [phone]

## 2023-07-25 LAB
25(OH)D3+25(OH)D2 SERPL-MCNC: 65.9 NG/ML (ref 30–100)
ALBUMIN SERPL-MCNC: 5.3 G/DL (ref 3.5–5.2)
ALBUMIN/GLOB SERPL: 3.8 G/DL
ALP SERPL-CCNC: 104 U/L (ref 39–117)
ALT SERPL-CCNC: 17 U/L (ref 1–33)
AST SERPL-CCNC: 19 U/L (ref 1–32)
BASOPHILS # BLD AUTO: 0.03 10*3/MM3 (ref 0–0.2)
BASOPHILS NFR BLD AUTO: 0.6 % (ref 0–1.5)
BILIRUB SERPL-MCNC: 0.4 MG/DL (ref 0–1.2)
BUN SERPL-MCNC: 18 MG/DL (ref 6–20)
BUN/CREAT SERPL: 20.2 (ref 7–25)
CALCIUM SERPL-MCNC: 10 MG/DL (ref 8.6–10.5)
CHLORIDE SERPL-SCNC: 101 MMOL/L (ref 98–107)
CHOLEST SERPL-MCNC: 225 MG/DL (ref 0–200)
CO2 SERPL-SCNC: 29.5 MMOL/L (ref 22–29)
CREAT SERPL-MCNC: 0.89 MG/DL (ref 0.57–1)
EGFRCR SERPLBLD CKD-EPI 2021: 75.3 ML/MIN/1.73
EOSINOPHIL # BLD AUTO: 0.2 10*3/MM3 (ref 0–0.4)
EOSINOPHIL NFR BLD AUTO: 3.7 % (ref 0.3–6.2)
ERYTHROCYTE [DISTWIDTH] IN BLOOD BY AUTOMATED COUNT: 13 % (ref 12.3–15.4)
GLOBULIN SER CALC-MCNC: 1.4 GM/DL
GLUCOSE SERPL-MCNC: 96 MG/DL (ref 65–99)
HBA1C MFR BLD: 5.3 % (ref 4.8–5.6)
HCT VFR BLD AUTO: 41.6 % (ref 34–46.6)
HDLC SERPL-MCNC: 43 MG/DL (ref 40–60)
HGB BLD-MCNC: 13.9 G/DL (ref 12–15.9)
IMM GRANULOCYTES # BLD AUTO: 0.01 10*3/MM3 (ref 0–0.05)
IMM GRANULOCYTES NFR BLD AUTO: 0.2 % (ref 0–0.5)
LDLC SERPL CALC-MCNC: 124 MG/DL (ref 0–100)
LYMPHOCYTES # BLD AUTO: 1.66 10*3/MM3 (ref 0.7–3.1)
LYMPHOCYTES NFR BLD AUTO: 31.1 % (ref 19.6–45.3)
MCH RBC QN AUTO: 29.6 PG (ref 26.6–33)
MCHC RBC AUTO-ENTMCNC: 33.4 G/DL (ref 31.5–35.7)
MCV RBC AUTO: 88.5 FL (ref 79–97)
MONOCYTES # BLD AUTO: 0.42 10*3/MM3 (ref 0.1–0.9)
MONOCYTES NFR BLD AUTO: 7.9 % (ref 5–12)
NEUTROPHILS # BLD AUTO: 3.02 10*3/MM3 (ref 1.7–7)
NEUTROPHILS NFR BLD AUTO: 56.5 % (ref 42.7–76)
NRBC BLD AUTO-RTO: 0 /100 WBC (ref 0–0.2)
PLATELET # BLD AUTO: 219 10*3/MM3 (ref 140–450)
POTASSIUM SERPL-SCNC: 4.2 MMOL/L (ref 3.5–5.2)
PROT SERPL-MCNC: 6.7 G/DL (ref 6–8.5)
RBC # BLD AUTO: 4.7 10*6/MM3 (ref 3.77–5.28)
SODIUM SERPL-SCNC: 140 MMOL/L (ref 136–145)
TRIGL SERPL-MCNC: 329 MG/DL (ref 0–150)
TSH SERPL DL<=0.005 MIU/L-ACNC: 1.95 UIU/ML (ref 0.27–4.2)
VLDLC SERPL CALC-MCNC: 58 MG/DL (ref 5–40)
WBC # BLD AUTO: 5.34 10*3/MM3 (ref 3.4–10.8)

## 2023-10-17 ENCOUNTER — TELEPHONE (OUTPATIENT)
Dept: INTERNAL MEDICINE | Facility: CLINIC | Age: 58
End: 2023-10-17
Payer: COMMERCIAL

## 2023-10-17 NOTE — TELEPHONE ENCOUNTER
Patients provider will be off boarding on 12/1/23. Called patient to reschedule 7/26/24 appointment with another provider in the office. Patient wants to think about it and call us back.    Hub to read.

## 2024-01-19 ENCOUNTER — OFFICE VISIT (OUTPATIENT)
Dept: INTERNAL MEDICINE | Facility: CLINIC | Age: 59
End: 2024-01-19
Payer: COMMERCIAL

## 2024-01-19 VITALS
DIASTOLIC BLOOD PRESSURE: 72 MMHG | HEIGHT: 64 IN | WEIGHT: 130 LBS | BODY MASS INDEX: 22.2 KG/M2 | SYSTOLIC BLOOD PRESSURE: 114 MMHG | TEMPERATURE: 98.2 F

## 2024-01-19 DIAGNOSIS — E55.9 VITAMIN D DEFICIENCY: ICD-10-CM

## 2024-01-19 DIAGNOSIS — E78.1 HYPERTRIGLYCERIDEMIA: Primary | ICD-10-CM

## 2024-01-19 DIAGNOSIS — Z00.00 ANNUAL PHYSICAL EXAM: ICD-10-CM

## 2024-01-19 LAB
CHOLEST SERPL-MCNC: 256 MG/DL (ref 0–200)
CHOLEST/HDLC SERPL: 5.95 {RATIO}
HDLC SERPL-MCNC: 43 MG/DL (ref 40–60)
LDLC SERPL CALC-MCNC: 176 MG/DL (ref 0–100)
TRIGL SERPL-MCNC: 199 MG/DL (ref 0–150)
VLDLC SERPL CALC-MCNC: 37 MG/DL (ref 5–40)

## 2024-01-19 RX ORDER — LANOLIN ALCOHOL/MO/W.PET/CERES
1000 CREAM (GRAM) TOPICAL DAILY
COMMUNITY

## 2024-01-19 RX ORDER — CHOLECALCIFEROL (VITAMIN D3) 125 MCG
CAPSULE ORAL NIGHTLY
COMMUNITY

## 2024-01-19 NOTE — PROGRESS NOTES
Subjective   Chief Complaint   Patient presents with    Hyperlipidemia     Establish care       History of Present Illness   Pt is a 58 year old female with hypertriglyceridemia, raynaud's, and vitamin D deficiency here to establish care; [facility]. She went off Fenofibrate a few months ago and is taking EPA-DHA and Omega 3 fish oils as well. Highest trigs have been is in the upper 300's-400's. She denies CP, SOA and palpitations. She sees GYN. Has had mtpl colonoscopies, most recent colon cancer screening was in 2022 with cologuard-negative.      Patient Active Problem List   Diagnosis    Constipation    Family history of colon cancer    Raynaud's disease without gangrene    Vitamin D deficiency    Mixed insomnia    Hypertriglyceridemia       Allergies   Allergen Reactions    Sulfamethoxazole-Trimethoprim Hives    Sulfa Antibiotics Rash       Current Outpatient Medications on File Prior to Visit   Medication Sig Dispense Refill    albuterol sulfate  (90 Base) MCG/ACT inhaler INHALE 2 PUFFS BY MOUTH EVERY 6 HOURS FOR 14 DAYS      Cholecalciferol (vitamin D3) 125 MCG (5000 UT) capsule capsule Take 1 capsule by mouth Daily.      clobetasol (TEMOVATE) 0.05 % ointment APPLY PEA SIZED TOPICALLY TO AREA TWICE DAILY FOR 4 WEEKS THEN DAILY FOR 4 WEEKS THEN ONCE A WEEK      L-Theanine 200 MG capsule Take  by mouth Every Night.      Magnesium 100 MG capsule       Unable to find Take 1 each by mouth 2 (Two) Times a Day Before Meals. OMEGAGENICS EPA/DHA      vitamin B-12 (CYANOCOBALAMIN) 1000 MCG tablet Take 1 tablet by mouth Daily.      vitamin C (ASCORBIC ACID) 250 MG tablet Take 1 tablet by mouth Daily.      [DISCONTINUED] fenofibrate (TRICOR) 145 MG tablet Take 1 tablet by mouth Daily. (Patient not taking: Reported on 1/19/2024) 90 tablet 1     No current facility-administered medications on file prior to visit.       Past Medical History:   Diagnosis Date    Anal fissure      Constipation     Family history of colon cancer     Family history of polyps in the colon     Hyperlipidemia type III     Insomnia     Moderate mixed hyperlipidemia not requiring statin therapy 9/4/2020    Non-smoker     Raynaud phenomenon     Rectal bleeding     Rectal pain        Family History   Problem Relation Age of Onset    Breast cancer Mother     Atrial fibrillation Mother     Cancer Mother         Breast cancer in 50s    Hyperlipidemia Father     Coronary artery disease Father 55    Cancer Father         unknown primary, found at stage 4 wiht omentum involvement    Liver disease Father     Diabetes Paternal Grandmother     Diabetes Paternal Grandfather     Colon cancer Other         aunt, uncle, cousin    Colon polyps Other         Aunt, Uncle, cousin       Social History     Socioeconomic History    Marital status:     Number of children: 3   Tobacco Use    Smoking status: Never    Smokeless tobacco: Never   Vaping Use    Vaping Use: Never used   Substance and Sexual Activity    Alcohol use: Not Currently     Comment: Occasional    Drug use: Never    Sexual activity: Yes     Partners: Male     Birth control/protection: Surgical       Past Surgical History:   Procedure Laterality Date    COLONOSCOPY      2006-Dr. Graves-Normal per pt./5/19/2015-Internal hemorrhoids    ENDOMETRIAL ABLATION  2007    TONSILLECTOMY AND ADENOIDECTOMY      at age 17    TUBAL ABDOMINAL LIGATION  2007         The following portions of the patient's history were reviewed and updated as appropriate: problem list, allergies, current medications, past medical history, past family history, past social history, and past surgical history.    ROS    See HPI    Immunization History   Administered Date(s) Administered    COVID-19 (PFIZER) Purple Cap Monovalent 03/09/2021, 03/30/2021, 01/04/2022    Td, Not Adsorbed 10/01/2008    Tdap 10/06/2020       Objective   Vitals:    01/19/24 1045   BP: 114/72   Temp: 98.2 °F (36.8 °C)  "  Weight: 59 kg (130 lb)   Height: 162.6 cm (64.02\")     Body mass index is 22.3 kg/m².  Physical Exam  Vitals reviewed.   Constitutional:       Appearance: She is well-developed.   HENT:      Head: Normocephalic and atraumatic.   Cardiovascular:      Rate and Rhythm: Normal rate and regular rhythm.      Heart sounds: Normal heart sounds, S1 normal and S2 normal.   Pulmonary:      Effort: Pulmonary effort is normal.      Breath sounds: Normal breath sounds.   Skin:     General: Skin is warm.   Neurological:      Mental Status: She is alert.   Psychiatric:         Behavior: Behavior normal.           Assessment & Plan   Diagnoses and all orders for this visit:    1. Hypertriglyceridemia (Primary)  -     Lipid Panel With / Chol / HDL Ratio  -     Lipid Panel With / Chol / HDL Ratio; Future    2. Annual physical exam  -     Comprehensive Metabolic Panel; Future  -     Lipid Panel With / Chol / HDL Ratio; Future  -     Vitamin D,25-Hydroxy; Future    3. Vitamin D deficiency  -     Vitamin D,25-Hydroxy; Future       She has been taking supplements and would like to repeat her labs today to see if they are still effective as she has been off Fenofibrate for many months.   Return in about 6 months (around 7/19/2024) for Lab Today, Annual physical, Lab Appt Before FUP.           "

## 2024-02-09 PROBLEM — M85.80 OSTEOPENIA: Status: ACTIVE | Noted: 2024-02-09

## 2024-03-25 RX ORDER — FENOFIBRATE 145 MG/1
145 TABLET, COATED ORAL DAILY
Qty: 90 TABLET | Refills: 1 | Status: SHIPPED | OUTPATIENT
Start: 2024-03-25

## 2024-07-23 ENCOUNTER — OFFICE VISIT (OUTPATIENT)
Dept: INTERNAL MEDICINE | Facility: CLINIC | Age: 59
End: 2024-07-23
Payer: COMMERCIAL

## 2024-07-23 VITALS
SYSTOLIC BLOOD PRESSURE: 116 MMHG | WEIGHT: 129 LBS | HEIGHT: 64 IN | DIASTOLIC BLOOD PRESSURE: 70 MMHG | TEMPERATURE: 98.2 F | BODY MASS INDEX: 22.02 KG/M2

## 2024-07-23 DIAGNOSIS — M85.80 OSTEOPENIA, UNSPECIFIED LOCATION: ICD-10-CM

## 2024-07-23 DIAGNOSIS — E55.9 VITAMIN D DEFICIENCY: ICD-10-CM

## 2024-07-23 DIAGNOSIS — Z00.00 ANNUAL PHYSICAL EXAM: Primary | ICD-10-CM

## 2024-07-23 DIAGNOSIS — E78.1 HYPERTRIGLYCERIDEMIA: ICD-10-CM

## 2024-07-23 PROCEDURE — 99396 PREV VISIT EST AGE 40-64: CPT | Performed by: PHYSICIAN ASSISTANT

## 2024-07-23 NOTE — PROGRESS NOTES
Subjective   Chief Complaint   Patient presents with    Annual Exam       History of Present Illness      Pt is here today for CPE. Restarted Fenofibrate after her numbers increased significantly off. Declines Shingles vaccine. Will see GYN this year. Cologuard in 2022 was negative. Denies CP, SOA and palpitations. Denies rectal bleeding or changes in bowel habits.   Patient Active Problem List   Diagnosis    Constipation    Family history of colon cancer    Raynaud's disease without gangrene    Vitamin D deficiency    Mixed insomnia    Hypertriglyceridemia    Osteopenia       Allergies   Allergen Reactions    Sulfamethoxazole-Trimethoprim Hives    Sulfa Antibiotics Rash       Current Outpatient Medications on File Prior to Visit   Medication Sig Dispense Refill    Cholecalciferol (vitamin D3) 125 MCG (5000 UT) capsule capsule Take 1 capsule by mouth Daily.      fenofibrate (TRICOR) 145 MG tablet Take 1 tablet by mouth Daily. 90 tablet 1    Magnesium 100 MG capsule       Unable to find Take 1 each by mouth 2 (Two) Times a Day Before Meals. OMEGAGENICS EPA/DHA      vitamin B-12 (CYANOCOBALAMIN) 1000 MCG tablet Take 1 tablet by mouth Daily.      clobetasol (TEMOVATE) 0.05 % ointment APPLY PEA SIZED TOPICALLY TO AREA TWICE DAILY FOR 4 WEEKS THEN DAILY FOR 4 WEEKS THEN ONCE A WEEK (Patient not taking: Reported on 7/23/2024)      [DISCONTINUED] albuterol sulfate  (90 Base) MCG/ACT inhaler INHALE 2 PUFFS BY MOUTH EVERY 6 HOURS FOR 14 DAYS      [DISCONTINUED] L-Theanine 200 MG capsule Take  by mouth Every Night.      [DISCONTINUED] vitamin C (ASCORBIC ACID) 250 MG tablet Take 1 tablet by mouth Daily.       No current facility-administered medications on file prior to visit.       Past Medical History:   Diagnosis Date    Anal fissure     Constipation     Family history of colon cancer     Family history of polyps in the colon     Hyperlipidemia type III     Insomnia     Moderate mixed hyperlipidemia not requiring  "statin therapy 9/4/2020    Non-smoker     Raynaud phenomenon     Rectal bleeding     Rectal pain        Family History   Problem Relation Age of Onset    Breast cancer Mother     Atrial fibrillation Mother     Cancer Mother         Breast cancer in 50s    Hyperlipidemia Father     Coronary artery disease Father 55    Cancer Father         unknown primary, found at stage 4 wiht omentum involvement    Liver disease Father     Diabetes Paternal Grandmother     Diabetes Paternal Grandfather     Colon cancer Other         aunt, uncle, cousin    Colon polyps Other         Aunt, Uncle, cousin       Social History     Socioeconomic History    Marital status:     Number of children: 3   Tobacco Use    Smoking status: Never    Smokeless tobacco: Never   Vaping Use    Vaping status: Never Used   Substance and Sexual Activity    Alcohol use: Not Currently     Comment: Occasional    Drug use: Never    Sexual activity: Yes     Partners: Male     Birth control/protection: Surgical       Past Surgical History:   Procedure Laterality Date    COLONOSCOPY      2006-Dr. Graves-Normal per pt./5/19/2015-Internal hemorrhoids    ENDOMETRIAL ABLATION  2007    TONSILLECTOMY AND ADENOIDECTOMY      at age 17    TUBAL ABDOMINAL LIGATION  2007         The following portions of the patient's history were reviewed and updated as appropriate: problem list, allergies, current medications, past medical history, past family history, past social history, and past surgical history.    ROS    See HPI    Immunization History   Administered Date(s) Administered    COVID-19 (PFIZER) Purple Cap Monovalent 03/09/2021, 03/30/2021, 01/04/2022    Td, Not Adsorbed 10/01/2008    Tdap 10/06/2020       Objective   Vitals:    07/23/24 0755   BP: 116/70   Temp: 98.2 °F (36.8 °C)   Weight: 58.5 kg (129 lb)   Height: 162.6 cm (64.02\")     Body mass index is 22.13 kg/m².  Physical Exam  Vitals and nursing note reviewed.   Constitutional:       Appearance: Normal " appearance.   HENT:      Head: Normocephalic and atraumatic.      Right Ear: Tympanic membrane and ear canal normal.      Left Ear: Tympanic membrane and ear canal normal.      Nose: Nose normal.      Mouth/Throat:      Mouth: Mucous membranes are moist.      Pharynx: Oropharynx is clear.   Eyes:      Extraocular Movements: Extraocular movements intact.      Conjunctiva/sclera: Conjunctivae normal.      Pupils: Pupils are equal, round, and reactive to light.   Neck:      Thyroid: No thyromegaly.      Vascular: No carotid bruit.   Cardiovascular:      Rate and Rhythm: Normal rate and regular rhythm.      Heart sounds: Normal heart sounds. No murmur heard.  Pulmonary:      Effort: Pulmonary effort is normal.      Breath sounds: Normal breath sounds.   Abdominal:      General: Abdomen is flat. Bowel sounds are normal.      Palpations: Abdomen is soft. There is no hepatomegaly or splenomegaly.      Tenderness: There is no abdominal tenderness. There is no guarding.   Musculoskeletal:      Cervical back: Neck supple.   Lymphadenopathy:      Cervical: No cervical adenopathy.   Skin:     General: Skin is warm and dry.   Neurological:      General: No focal deficit present.      Mental Status: She is alert and oriented to person, place, and time. Mental status is at baseline.      Gait: Gait normal.   Psychiatric:         Mood and Affect: Mood normal.         Behavior: Behavior normal.         Thought Content: Thought content normal.         Judgment: Judgment normal.           Assessment & Plan   Diagnoses and all orders for this visit:    1. Annual physical exam (Primary)  Comments:  Sees GYn. Declines Shingrix. cologuard in 2022, repeat 2025.  Orders:  -     Comprehensive Metabolic Panel; Future  -     Lipid Panel With / Chol / HDL Ratio; Future  -     Vitamin D,25-Hydroxy; Future    2. Hypertriglyceridemia  Comments:  Continue Fenofibrate.  Orders:  -     Comprehensive Metabolic Panel; Future  -     Lipid Panel With /  Chol / HDL Ratio; Future    3. Osteopenia, unspecified location  Comments:  GYN managing.    4. Vitamin D deficiency  Comments:  Therapeutic.  Orders:  -     Vitamin D,25-Hydroxy; Future     Recommended healthy diet, and 150 min of aerobic exercise per week      Return in about 1 year (around 7/23/2025) for Lab Appt Before FUP, Annual physical.

## 2025-04-07 RX ORDER — FENOFIBRATE 145 MG/1
145 TABLET, COATED ORAL DAILY
Qty: 90 TABLET | Refills: 1 | Status: SHIPPED | OUTPATIENT
Start: 2025-04-07

## 2025-07-25 ENCOUNTER — OFFICE VISIT (OUTPATIENT)
Dept: INTERNAL MEDICINE | Facility: CLINIC | Age: 60
End: 2025-07-25
Payer: COMMERCIAL

## 2025-07-25 VITALS
TEMPERATURE: 98 F | BODY MASS INDEX: 22.02 KG/M2 | SYSTOLIC BLOOD PRESSURE: 112 MMHG | DIASTOLIC BLOOD PRESSURE: 70 MMHG | HEIGHT: 64 IN | WEIGHT: 129 LBS

## 2025-07-25 DIAGNOSIS — Z00.00 ANNUAL PHYSICAL EXAM: ICD-10-CM

## 2025-07-25 DIAGNOSIS — M85.80 OSTEOPENIA, UNSPECIFIED LOCATION: ICD-10-CM

## 2025-07-25 DIAGNOSIS — E78.1 HYPERTRIGLYCERIDEMIA: ICD-10-CM

## 2025-07-25 DIAGNOSIS — E55.9 VITAMIN D DEFICIENCY: ICD-10-CM

## 2025-07-25 DIAGNOSIS — Z13.6 ENCOUNTER FOR SCREENING FOR CORONARY ARTERY DISEASE: Primary | ICD-10-CM

## 2025-07-25 NOTE — PROGRESS NOTES
Subjective   Chief Complaint   Patient presents with    Annual Exam       History of Present Illness     Pt is here today for CPE. She has been feeling well. No CP or SOA. Had a coronary artery calcium score in 2021 that was 0. Her particle size reflects small and elevated LDL-P. She is utd with GYN has her annual in the fall.      Patient Active Problem List   Diagnosis    Constipation    Family history of colon cancer    Raynaud's disease without gangrene    Vitamin D deficiency    Mixed insomnia    Hypertriglyceridemia    Osteopenia       Allergies   Allergen Reactions    Sulfamethoxazole-Trimethoprim Hives    Sulfa Antibiotics Rash       Current Outpatient Medications on File Prior to Visit   Medication Sig Dispense Refill    Cholecalciferol (vitamin D3) 125 MCG (5000 UT) capsule capsule Take 1 capsule by mouth Daily.      fenofibrate (TRICOR) 145 MG tablet TAKE 1 TABLET BY MOUTH DAILY 90 tablet 1    Magnesium 100 MG capsule       clobetasol (TEMOVATE) 0.05 % ointment APPLY PEA SIZED TOPICALLY TO AREA TWICE DAILY FOR 4 WEEKS THEN DAILY FOR 4 WEEKS THEN ONCE A WEEK (Patient not taking: Reported on 7/25/2025)      [DISCONTINUED] Unable to find Take 1 each by mouth 2 (Two) Times a Day Before Meals. OMEGAGENICS EPA/DHA      [DISCONTINUED] vitamin B-12 (CYANOCOBALAMIN) 1000 MCG tablet Take 1 tablet by mouth Daily.       No current facility-administered medications on file prior to visit.       Past Medical History:   Diagnosis Date    Anal fissure     Constipation     Family history of colon cancer     Family history of polyps in the colon     Hyperlipidemia type III     Insomnia     Moderate mixed hyperlipidemia not requiring statin therapy 9/4/2020    Non-smoker     Raynaud phenomenon     Rectal bleeding     Rectal pain        Family History   Problem Relation Age of Onset    Breast cancer Mother     Atrial fibrillation Mother     Cancer Mother         Breast cancer in 50s    Hyperlipidemia Father     Coronary  "artery disease Father 55    Cancer Father         unknown primary, found at stage 4 wiht omentum involvement    Liver disease Father     Diabetes Paternal Grandmother     Diabetes Paternal Grandfather     Colon cancer Other         aunt, uncle, cousin    Colon polyps Other         Aunt, Uncle, cousin       Social History     Socioeconomic History    Marital status:     Number of children: 3   Tobacco Use    Smoking status: Never    Smokeless tobacco: Never   Vaping Use    Vaping status: Never Used   Substance and Sexual Activity    Alcohol use: Not Currently     Comment: Occasional    Drug use: Never    Sexual activity: Yes     Partners: Male     Birth control/protection: Surgical       Past Surgical History:   Procedure Laterality Date    COLONOSCOPY      2006-Dr. Graves-Normal per pt./5/19/2015-Internal hemorrhoids    ENDOMETRIAL ABLATION  2007    TONSILLECTOMY AND ADENOIDECTOMY      at age 17    TUBAL ABDOMINAL LIGATION  2007         The following portions of the patient's history were reviewed and updated as appropriate: problem list, allergies, current medications, past medical history, past family history, past social history, and past surgical history.    ROS    See HPI    Immunization History   Administered Date(s) Administered    COVID-19 (PFIZER) Purple Cap Monovalent 03/09/2021, 03/30/2021, 01/04/2022    Td, Not Adsorbed 10/01/2008    Tdap 10/06/2020       Objective   Vitals:    07/25/25 0805   BP: 112/70   Temp: 98 °F (36.7 °C)   Weight: 58.5 kg (129 lb)   Height: 162.6 cm (64.02\")     Body mass index is 22.13 kg/m².  Physical Exam  Vitals and nursing note reviewed.   Constitutional:       Appearance: Normal appearance.   HENT:      Head: Normocephalic and atraumatic.      Right Ear: Tympanic membrane and ear canal normal.      Left Ear: Tympanic membrane and ear canal normal.      Nose: Nose normal.      Mouth/Throat:      Mouth: Mucous membranes are moist.      Pharynx: Oropharynx is clear. "   Eyes:      Extraocular Movements: Extraocular movements intact.      Conjunctiva/sclera: Conjunctivae normal.      Pupils: Pupils are equal, round, and reactive to light.   Neck:      Thyroid: No thyromegaly.      Vascular: No carotid bruit.   Cardiovascular:      Rate and Rhythm: Normal rate and regular rhythm.      Heart sounds: Normal heart sounds. No murmur heard.  Pulmonary:      Effort: Pulmonary effort is normal.      Breath sounds: Normal breath sounds.   Abdominal:      General: Abdomen is flat. Bowel sounds are normal.      Palpations: Abdomen is soft. There is no hepatomegaly or splenomegaly.      Tenderness: There is no abdominal tenderness. There is no guarding.   Musculoskeletal:      Cervical back: Neck supple.   Lymphadenopathy:      Cervical: No cervical adenopathy.   Skin:     General: Skin is warm and dry.   Neurological:      General: No focal deficit present.      Mental Status: She is alert and oriented to person, place, and time. Mental status is at baseline.      Gait: Gait normal.   Psychiatric:         Mood and Affect: Mood normal.         Behavior: Behavior normal.         Thought Content: Thought content normal.         Judgment: Judgment normal.           Assessment & Plan   Diagnoses and all orders for this visit:    1. Encounter for screening for coronary artery disease (Primary)  -     CT Cardiac Calcium Score Without Dye    2. Annual physical exam    3. Vitamin D deficiency    4. Hypertriglyceridemia    5. Osteopenia, unspecified location     GYN will repeat her DEXA this fall, her vitamin D level is therapeutic. Reviewed NMR lipo profile I do recommend starting a statin, will repeat her coronary artery calcium score. She is due for colon cancer screening-will determine whether she wants cologuard again or colonoscopy. Recommended healthy diet, and 150 min of aerobic exercise per week      Return in about 1 year (around 7/25/2026) for Lab Appt Before FUP, Annual physical.

## 2025-08-13 ENCOUNTER — HOSPITAL ENCOUNTER (OUTPATIENT)
Facility: HOSPITAL | Age: 60
Discharge: HOME | End: 2025-08-13
Admitting: PHYSICIAN ASSISTANT

## 2025-08-13 PROCEDURE — 75571 CT HRT W/O DYE W/CA TEST: CPT

## 2025-08-20 ENCOUNTER — AMBULATORY SURGICAL CENTER (AMBULATORY)
Dept: URBAN - METROPOLITAN AREA SURGERY 17 | Facility: SURGERY | Age: 60
End: 2025-08-20
Payer: COMMERCIAL

## 2025-08-20 VITALS
HEART RATE: 79 BPM | DIASTOLIC BLOOD PRESSURE: 81 MMHG | RESPIRATION RATE: 20 BRPM | SYSTOLIC BLOOD PRESSURE: 144 MMHG | DIASTOLIC BLOOD PRESSURE: 86 MMHG | OXYGEN SATURATION: 100 % | HEART RATE: 88 BPM | HEIGHT: 64 IN | RESPIRATION RATE: 16 BRPM | WEIGHT: 127 LBS | HEART RATE: 82 BPM | SYSTOLIC BLOOD PRESSURE: 133 MMHG | TEMPERATURE: 97 F | DIASTOLIC BLOOD PRESSURE: 65 MMHG | RESPIRATION RATE: 12 BRPM | OXYGEN SATURATION: 97 % | RESPIRATION RATE: 14 BRPM | DIASTOLIC BLOOD PRESSURE: 92 MMHG | HEART RATE: 74 BPM | SYSTOLIC BLOOD PRESSURE: 154 MMHG | DIASTOLIC BLOOD PRESSURE: 63 MMHG | SYSTOLIC BLOOD PRESSURE: 102 MMHG | SYSTOLIC BLOOD PRESSURE: 125 MMHG | TEMPERATURE: 97.8 F | SYSTOLIC BLOOD PRESSURE: 115 MMHG | DIASTOLIC BLOOD PRESSURE: 99 MMHG | RESPIRATION RATE: 17 BRPM

## 2025-08-20 DIAGNOSIS — Z80.0 FAMILY HISTORY OF MALIGNANT NEOPLASM OF DIGESTIVE ORGANS: ICD-10-CM

## 2025-08-20 DIAGNOSIS — Z12.11 ENCOUNTER FOR SCREENING FOR MALIGNANT NEOPLASM OF COLON: ICD-10-CM

## 2025-08-20 DIAGNOSIS — Z53.8 PROCEDURE AND TREATMENT NOT CARRIED OUT FOR OTHER REASONS: ICD-10-CM

## 2025-08-20 PROCEDURE — G0105 COLORECTAL SCRN; HI RISK IND: HCPCS | Mod: 53 | Performed by: INTERNAL MEDICINE

## 2025-08-21 ENCOUNTER — AMBULATORY SURGICAL CENTER (AMBULATORY)
Dept: URBAN - METROPOLITAN AREA SURGERY 17 | Facility: SURGERY | Age: 60
End: 2025-08-21

## 2025-08-21 ENCOUNTER — OFFICE (AMBULATORY)
Dept: URBAN - METROPOLITAN AREA PATHOLOGY 4 | Facility: PATHOLOGY | Age: 60
End: 2025-08-21
Payer: COMMERCIAL

## 2025-08-21 VITALS
DIASTOLIC BLOOD PRESSURE: 68 MMHG | HEART RATE: 67 BPM | OXYGEN SATURATION: 99 % | SYSTOLIC BLOOD PRESSURE: 104 MMHG | DIASTOLIC BLOOD PRESSURE: 64 MMHG | OXYGEN SATURATION: 100 % | SYSTOLIC BLOOD PRESSURE: 115 MMHG | SYSTOLIC BLOOD PRESSURE: 89 MMHG | RESPIRATION RATE: 21 BRPM | DIASTOLIC BLOOD PRESSURE: 63 MMHG | SYSTOLIC BLOOD PRESSURE: 108 MMHG | HEIGHT: 64 IN | WEIGHT: 122 LBS | DIASTOLIC BLOOD PRESSURE: 79 MMHG | RESPIRATION RATE: 17 BRPM | HEART RATE: 80 BPM | SYSTOLIC BLOOD PRESSURE: 127 MMHG | OXYGEN SATURATION: 98 % | SYSTOLIC BLOOD PRESSURE: 114 MMHG | SYSTOLIC BLOOD PRESSURE: 95 MMHG | HEART RATE: 71 BPM | HEART RATE: 69 BPM | DIASTOLIC BLOOD PRESSURE: 62 MMHG | RESPIRATION RATE: 20 BRPM | HEART RATE: 68 BPM | DIASTOLIC BLOOD PRESSURE: 71 MMHG | HEART RATE: 72 BPM | RESPIRATION RATE: 15 BRPM | HEART RATE: 59 BPM | SYSTOLIC BLOOD PRESSURE: 98 MMHG | TEMPERATURE: 974 F | RESPIRATION RATE: 18 BRPM | TEMPERATURE: 97.3 F | RESPIRATION RATE: 16 BRPM | HEART RATE: 66 BPM | DIASTOLIC BLOOD PRESSURE: 58 MMHG

## 2025-08-21 DIAGNOSIS — D12.5 BENIGN NEOPLASM OF SIGMOID COLON: ICD-10-CM

## 2025-08-21 DIAGNOSIS — Z80.0 FAMILY HISTORY OF MALIGNANT NEOPLASM OF DIGESTIVE ORGANS: ICD-10-CM

## 2025-08-21 DIAGNOSIS — Z12.11 ENCOUNTER FOR SCREENING FOR MALIGNANT NEOPLASM OF COLON: ICD-10-CM

## 2025-08-21 PROBLEM — K63.5 POLYP OF COLON: Status: ACTIVE | Noted: 2025-08-21

## 2025-08-21 LAB
GI HISTOLOGY: A. SIGMOID COLON: (no result)
GI HISTOLOGY: PDF REPORT: (no result)

## 2025-08-21 PROCEDURE — 88305 TISSUE EXAM BY PATHOLOGIST: CPT | Performed by: PATHOLOGY

## 2025-08-21 PROCEDURE — 45385 COLONOSCOPY W/LESION REMOVAL: CPT | Mod: 33 | Performed by: INTERNAL MEDICINE

## 2025-08-22 DIAGNOSIS — E78.1 HYPERTRIGLYCERIDEMIA: Primary | ICD-10-CM

## 2025-08-22 RX ORDER — FENOFIBRATE 145 MG/1
145 TABLET, FILM COATED ORAL DAILY
Qty: 90 TABLET | Refills: 1 | Status: SHIPPED | OUTPATIENT
Start: 2025-08-22